# Patient Record
Sex: FEMALE | Race: WHITE | NOT HISPANIC OR LATINO | Employment: FULL TIME | URBAN - METROPOLITAN AREA
[De-identification: names, ages, dates, MRNs, and addresses within clinical notes are randomized per-mention and may not be internally consistent; named-entity substitution may affect disease eponyms.]

---

## 2017-09-20 ENCOUNTER — ALLSCRIPTS OFFICE VISIT (OUTPATIENT)
Dept: OTHER | Facility: OTHER | Age: 57
End: 2017-09-20

## 2017-09-20 DIAGNOSIS — Z12.31 ENCOUNTER FOR SCREENING MAMMOGRAM FOR MALIGNANT NEOPLASM OF BREAST: ICD-10-CM

## 2017-10-10 ENCOUNTER — HOSPITAL ENCOUNTER (OUTPATIENT)
Dept: RADIOLOGY | Age: 57
Discharge: HOME | End: 2017-10-10
Payer: COMMERCIAL

## 2017-10-10 DIAGNOSIS — Z12.31 ENCOUNTER FOR SCREENING MAMMOGRAM FOR MALIGNANT NEOPLASM OF BREAST: ICD-10-CM

## 2017-10-10 PROCEDURE — G0202 SCR MAMMO BI INCL CAD: HCPCS

## 2017-10-10 PROCEDURE — 77063 BREAST TOMOSYNTHESIS BI: CPT

## 2017-10-27 NOTE — PROGRESS NOTES
Assessment  1  Encounter for gynecological examination without abnormal finding (V72 31) (Z01 419)    Plan  Encounter for gynecological examination without abnormal finding    · Follow-up visit in 1 year Evaluation and Treatment  Follow-up  Status: Hold For -  Scheduling  Requested for: [id number]   Ordered; For: Encounter for gynecological examination without abnormal finding; Ordered By: Violetta Monson Performed:  Due: 01Rpv9448  Encounter for screening mammogram for malignant neoplasm of breast    · * MAMMO SCREENING BILATERAL W CAD; Status:Hold For - Scheduling; Requested  for:34Myt9919;    Perform:[facility]; Due:05Ztd9480; Ordered;For:Encounter for screening mammogram for malignant neoplasm of breast; Ordered By:Torsten Fox; Unlinked    · Omeprazole 20 MG Oral Capsule Delayed Release   Rx By: Radha Romero; Dispense: 30 Days ; #:30 00 CPDR; Refill: 0; SUSAN = Y; Sent To:    Discussion/Summary  healthy adult female the risks and benefits of cervical cancer screening were discussed cervical cancer screening is current Breast cancer screening: the risks and benefits of breast cancer screening were discussed, monthly self breast exam was advised and mammogram has been ordered  Colorectal cancer screening: the risks and benefits of colorectal cancer screening were discussed and colorectal cancer screening is current  Advice and education were given regarding weight bearing exercise, calcium supplements and vitamin D supplements  Chief Complaint  Pt is here for her yearly exam      History of Present Illness  HPI: 49year old white female here for yearly exam, no complaints  She is postmenopausal and has had no bleeding  She is sexually active without pain or bleeding  GYN HM, Adult Female [facility]: The patient is being seen for a health maintenance evaluation  The last health maintenance visit was 1 year(s) ago  General Health: The patient's health since the last visit is described as good  Lifestyle:  She consumes a diverse and healthy diet  -- She does not have any weight concerns  -- She does not exercise regularly  -- She does not use tobacco -- She consumes alcohol  She reports occasional alcohol use  -- She denies drug use  Reproductive health: the patient is postmenopausal--   she is sexually active  -- pregnancy history: G 3P 3,-- 3  Screening: Cervical cancer screening includes a pap smear performed 2014,neg  -- and-- human papilloma virus screening performed 2014,neg  Breast cancer screening includes a mammogram performed 2015, WNL  Colorectal cancer screening includes a colonoscopy performed within the past ten years  Metabolic screening includes no previous DEXA  Active Problems  1  Encounter for gynecological examination without abnormal finding (V72 31) (Z01 419)   2  Encounter for screening mammogram for malignant neoplasm of breast (V76 12)   (Z12 31)   3  Postmenopausal atrophic vaginitis (627 3) (N95 2)    Past Medical History   · History of depression (V11 8) (Z86 59)   · History of malignant melanoma (V10 82) (Z85 820)   · History of  (normal spontaneous vaginal delivery) (650) (O80)   · History of NVD (normal vaginal delivery) (650) (O80)    Surgical History   · History of  Section   · History of Complete Colonoscopy   · History of Knee Surgery Left   · History of Knee Surgery Right    Family History  Mother    · Family history of hypertension (V17 49) (Z82 49)   · Family history of osteoporosis (V17 81) (Z80 61)  Father    · Family history of cardiac disorder (V17 49) (Z82 49)   · Family history of colon cancer (V16 0) (Z80 0)   · Family history of diabetes mellitus (V18 0) (Z83 3)  Sister    · Family history of thyroid disease (V18 19) (Z83 49)    Social History   · Denied: History of Currently sexually active   · Denied: History of Exercise habits   ·    · Never a smoker   · No drug use   · Partner had vasectomy    Current Meds   1  Citalopram Hydrobromide 20 MG Oral Tablet; Therapy: 67CDZ4568 to (Last Rx:29Xes0134)  Requested for: 88Jzg8732 Ordered   2  Fluticasone Propionate 50 MCG/ACT Nasal Suspension; Therapy: 29Ssr5712 to (Last Rx:42Sxl8760)  Requested for: 20Wpa8082 Ordered   3  LamoTRIgine 25 MG Oral Tablet; Therapy: (Recorded:59Sfn9139) to Recorded   4  LORazepam 1 MG Oral Tablet; Therapy: (Recorded:28Ges8808) to Recorded   5  Omeprazole 20 MG Oral Capsule Delayed Release; Therapy: 74UQQ4012 to (Last Rx:95Igq9924)  Requested for: 95BWQ3391 Ordered    Allergies  1  No Known Drug Allergies    Vitals   Recorded: 09CPW1651 77:55HG   Systolic 460, LUE, Sitting   Diastolic 72, LUE, Sitting   Height 5 ft 1 5 in   Weight 172 lb    BMI Calculated 31 97   BSA Calculated 1 78   LMP      Physical Exam    Constitutional   General appearance: No acute distress, well appearing and well nourished  Neck   Neck: Normal, supple, trachea midline, no masses  Genitourinary   External genitalia: Normal and no lesions appreciated  Vagina: Normal, no lesions or dryness appreciated  Urethra: Normal     Urethral meatus: Normal     Bladder: Normal, soft, non-tender and no prolapse or masses appreciated  Cervix: Normal, no palpable masses  Uterus: Normal, non-tender, not enlarged, and no palpable masses  Adnexa/parametria: Normal, non-tender and no fullness or masses appreciated  Chest   Breasts: Normal and no dimpling or skin changes noted  Abdomen   Abdomen: Normal, non-tender, and no organomegaly noted         Signatures   Electronically signed by : Sparkle Toussaint [facility]; Sep 20 2017  2:09PM EST                       (Author)    Electronically signed by : JOE Collazo ; Sep 21 2017  1:19PM EST                       (Acknowledgement)

## 2017-12-05 ENCOUNTER — LAB REQUISITION (OUTPATIENT)
Dept: LAB | Facility: HOSPITAL | Age: 57
End: 2017-12-05
Payer: COMMERCIAL

## 2017-12-05 DIAGNOSIS — A41.01 SEPSIS DUE TO METHICILLIN SUSCEPTIBLE STAPHYLOCOCCUS AUREUS (HCC): ICD-10-CM

## 2017-12-05 PROCEDURE — 87205 SMEAR GRAM STAIN: CPT | Performed by: PHYSICIAN ASSISTANT

## 2017-12-05 PROCEDURE — 87070 CULTURE OTHR SPECIMN AEROBIC: CPT | Performed by: PHYSICIAN ASSISTANT

## 2017-12-07 LAB
BACTERIA WND AEROBE CULT: NORMAL
GRAM STN SPEC: NORMAL
GRAM STN SPEC: NORMAL

## 2018-01-14 VITALS
WEIGHT: 172 LBS | SYSTOLIC BLOOD PRESSURE: 120 MMHG | HEIGHT: 62 IN | BODY MASS INDEX: 31.65 KG/M2 | DIASTOLIC BLOOD PRESSURE: 72 MMHG

## 2018-04-28 ENCOUNTER — APPOINTMENT (OUTPATIENT)
Dept: LAB | Facility: CLINIC | Age: 58
End: 2018-04-28
Payer: COMMERCIAL

## 2018-04-28 ENCOUNTER — TRANSCRIBE ORDERS (OUTPATIENT)
Dept: LAB | Facility: CLINIC | Age: 58
End: 2018-04-28

## 2018-04-28 DIAGNOSIS — E66.9 OBESITY, UNSPECIFIED CLASSIFICATION, UNSPECIFIED OBESITY TYPE, UNSPECIFIED WHETHER SERIOUS COMORBIDITY PRESENT: ICD-10-CM

## 2018-04-28 DIAGNOSIS — E78.5 HYPERLIPIDEMIA, UNSPECIFIED HYPERLIPIDEMIA TYPE: Primary | ICD-10-CM

## 2018-04-28 DIAGNOSIS — E78.5 HYPERLIPIDEMIA, UNSPECIFIED HYPERLIPIDEMIA TYPE: ICD-10-CM

## 2018-04-28 LAB
ALBUMIN SERPL BCP-MCNC: 3.7 G/DL (ref 3.5–5)
ALP SERPL-CCNC: 116 U/L (ref 46–116)
ALT SERPL W P-5'-P-CCNC: 35 U/L (ref 12–78)
ANION GAP SERPL CALCULATED.3IONS-SCNC: 6 MMOL/L (ref 4–13)
AST SERPL W P-5'-P-CCNC: 19 U/L (ref 5–45)
BASOPHILS # BLD AUTO: 0.01 THOUSANDS/ΜL (ref 0–0.1)
BASOPHILS NFR BLD AUTO: 0 % (ref 0–1)
BILIRUB SERPL-MCNC: 0.4 MG/DL (ref 0.2–1)
BUN SERPL-MCNC: 7 MG/DL (ref 5–25)
CALCIUM SERPL-MCNC: 9.4 MG/DL (ref 8.3–10.1)
CHLORIDE SERPL-SCNC: 104 MMOL/L (ref 100–108)
CHOLEST SERPL-MCNC: 212 MG/DL (ref 50–200)
CO2 SERPL-SCNC: 29 MMOL/L (ref 21–32)
CREAT SERPL-MCNC: 0.84 MG/DL (ref 0.6–1.3)
EOSINOPHIL # BLD AUTO: 0.12 THOUSAND/ΜL (ref 0–0.61)
EOSINOPHIL NFR BLD AUTO: 2 % (ref 0–6)
ERYTHROCYTE [DISTWIDTH] IN BLOOD BY AUTOMATED COUNT: 12.2 % (ref 11.6–15.1)
GFR SERPL CREATININE-BSD FRML MDRD: 78 ML/MIN/1.73SQ M
GLUCOSE P FAST SERPL-MCNC: 104 MG/DL (ref 65–99)
HCT VFR BLD AUTO: 43.4 % (ref 34.8–46.1)
HDLC SERPL-MCNC: 53 MG/DL (ref 40–60)
HGB BLD-MCNC: 14.3 G/DL (ref 11.5–15.4)
LDLC SERPL CALC-MCNC: 144 MG/DL (ref 0–100)
LYMPHOCYTES # BLD AUTO: 1.65 THOUSANDS/ΜL (ref 0.6–4.47)
LYMPHOCYTES NFR BLD AUTO: 26 % (ref 14–44)
MCH RBC QN AUTO: 30.6 PG (ref 26.8–34.3)
MCHC RBC AUTO-ENTMCNC: 32.9 G/DL (ref 31.4–37.4)
MCV RBC AUTO: 93 FL (ref 82–98)
MONOCYTES # BLD AUTO: 0.34 THOUSAND/ΜL (ref 0.17–1.22)
MONOCYTES NFR BLD AUTO: 5 % (ref 4–12)
NEUTROPHILS # BLD AUTO: 4.22 THOUSANDS/ΜL (ref 1.85–7.62)
NEUTS SEG NFR BLD AUTO: 67 % (ref 43–75)
NONHDLC SERPL-MCNC: 159 MG/DL
PLATELET # BLD AUTO: 329 THOUSANDS/UL (ref 149–390)
PMV BLD AUTO: 9.2 FL (ref 8.9–12.7)
POTASSIUM SERPL-SCNC: 4.3 MMOL/L (ref 3.5–5.3)
PROT SERPL-MCNC: 7.2 G/DL (ref 6.4–8.2)
RBC # BLD AUTO: 4.68 MILLION/UL (ref 3.81–5.12)
SODIUM SERPL-SCNC: 139 MMOL/L (ref 136–145)
TRIGL SERPL-MCNC: 73 MG/DL
TSH SERPL DL<=0.05 MIU/L-ACNC: 2.41 UIU/ML (ref 0.36–3.74)
WBC # BLD AUTO: 6.34 THOUSAND/UL (ref 4.31–10.16)

## 2018-04-28 PROCEDURE — 85025 COMPLETE CBC W/AUTO DIFF WBC: CPT

## 2018-04-28 PROCEDURE — 80061 LIPID PANEL: CPT

## 2018-04-28 PROCEDURE — 84443 ASSAY THYROID STIM HORMONE: CPT

## 2018-04-28 PROCEDURE — 80053 COMPREHEN METABOLIC PANEL: CPT

## 2018-04-28 PROCEDURE — 36415 COLL VENOUS BLD VENIPUNCTURE: CPT

## 2018-05-25 ENCOUNTER — OFFICE VISIT (OUTPATIENT)
Dept: OBGYN CLINIC | Facility: CLINIC | Age: 58
End: 2018-05-25
Payer: COMMERCIAL

## 2018-05-25 VITALS — DIASTOLIC BLOOD PRESSURE: 74 MMHG | BODY MASS INDEX: 34.02 KG/M2 | SYSTOLIC BLOOD PRESSURE: 120 MMHG | WEIGHT: 183 LBS

## 2018-05-25 DIAGNOSIS — B36.9 FUNGAL DERMATITIS: Primary | ICD-10-CM

## 2018-05-25 PROCEDURE — 99214 OFFICE O/P EST MOD 30 MIN: CPT | Performed by: PHYSICIAN ASSISTANT

## 2018-05-25 RX ORDER — RABEPRAZOLE SODIUM 20 MG/1
TABLET, DELAYED RELEASE ORAL
Refills: 0 | COMMUNITY
Start: 2018-05-23 | End: 2018-09-21 | Stop reason: ALTCHOICE

## 2018-05-25 RX ORDER — NYSTATIN 100000 [USP'U]/G
POWDER TOPICAL 2 TIMES DAILY
Qty: 60 G | Refills: 3 | Status: SHIPPED | OUTPATIENT
Start: 2018-05-25 | End: 2018-12-21

## 2018-05-25 RX ORDER — LORAZEPAM 1 MG/1
TABLET ORAL
Refills: 0 | COMMUNITY
Start: 2018-05-21 | End: 2019-10-01 | Stop reason: ALTCHOICE

## 2018-05-25 RX ORDER — ESCITALOPRAM OXALATE 10 MG/1
10 TABLET ORAL EVERY MORNING
Refills: 0 | COMMUNITY
Start: 2018-04-27

## 2018-05-25 RX ORDER — LAMOTRIGINE 25 MG/1
TABLET ORAL
Refills: 0 | COMMUNITY
Start: 2018-05-22 | End: 2019-10-01 | Stop reason: ALTCHOICE

## 2018-09-21 ENCOUNTER — ANNUAL EXAM (OUTPATIENT)
Dept: OBGYN CLINIC | Facility: CLINIC | Age: 58
End: 2018-09-21
Payer: COMMERCIAL

## 2018-09-21 VITALS
DIASTOLIC BLOOD PRESSURE: 74 MMHG | SYSTOLIC BLOOD PRESSURE: 120 MMHG | BODY MASS INDEX: 34.36 KG/M2 | HEIGHT: 61 IN | WEIGHT: 182 LBS

## 2018-09-21 DIAGNOSIS — R53.82 CHRONIC FATIGUE: ICD-10-CM

## 2018-09-21 DIAGNOSIS — Z12.31 ENCOUNTER FOR SCREENING MAMMOGRAM FOR MALIGNANT NEOPLASM OF BREAST: ICD-10-CM

## 2018-09-21 DIAGNOSIS — N95.2 ATROPHIC VAGINITIS: ICD-10-CM

## 2018-09-21 DIAGNOSIS — Z01.419 ENCNTR FOR GYN EXAM (GENERAL) (ROUTINE) W/O ABN FINDINGS: Primary | ICD-10-CM

## 2018-09-21 PROCEDURE — S0612 ANNUAL GYNECOLOGICAL EXAMINA: HCPCS | Performed by: PHYSICIAN ASSISTANT

## 2018-09-21 RX ORDER — CETIRIZINE HYDROCHLORIDE 10 MG/1
10 TABLET ORAL DAILY
COMMUNITY
End: 2019-10-01 | Stop reason: ALTCHOICE

## 2018-09-21 RX ORDER — OMEPRAZOLE 20 MG/1
20 CAPSULE, DELAYED RELEASE ORAL DAILY
COMMUNITY
End: 2021-10-22 | Stop reason: SDUPTHER

## 2018-09-21 RX ORDER — ESTRADIOL 0.1 MG/G
1 CREAM VAGINAL 2 TIMES WEEKLY
Qty: 42.5 G | Refills: 3 | Status: SHIPPED | OUTPATIENT
Start: 2018-09-24 | End: 2019-10-01 | Stop reason: ALTCHOICE

## 2018-09-21 NOTE — PROGRESS NOTES
Vinnie Duque   1960    CC:  Yearly exam    S:  56 y o  female here for yearly exam  She is postmenopausal and has had no vaginal bleeding  She denies vaginal discharge, itching, odor or dryness  She is sexually active with discomfort despite silicone lubricants  We discussed a trial of Estrace cream and she would like to try this  We also discussed Shirley Nicks but mentioned that it may worsen her hot flashes  Last Pap 7/10/14 neg/neg  Last Mammo 10/10/17 neg  Last Colonoscopy 3 years ago     Current Outpatient Prescriptions:     cetirizine (ZyrTEC) 10 mg tablet, Take 10 mg by mouth daily, Disp: , Rfl:     escitalopram (LEXAPRO) 10 mg tablet, Take 10 mg by mouth every morning, Disp: , Rfl: 0    lamoTRIgine (LaMICtal) 25 mg tablet, , Disp: , Rfl: 0    LORazepam (ATIVAN) 1 mg tablet, , Disp: , Rfl: 0    nystatin (MYCOSTATIN) powder, Apply topically 2 (two) times a day, Disp: 60 g, Rfl: 3    omeprazole (PriLOSEC) 20 mg delayed release capsule, Take 20 mg by mouth daily, Disp: , Rfl:     [START ON 9/24/2018] estradiol (ESTRACE) 0 1 mg/g vaginal cream, Insert 1 g into the vagina 2 (two) times a week, Disp: 42 5 g, Rfl: 3  Social History     Social History    Marital status: /Civil Union     Spouse name: N/A    Number of children: N/A    Years of education: N/A     Occupational History    Not on file       Social History Main Topics    Smoking status: Never Smoker    Smokeless tobacco: Never Used    Alcohol use Yes      Comment: occ    Drug use: No    Sexual activity: Yes     Partners: Male     Other Topics Concern    Not on file     Social History Narrative    Partner had vasectomy         Family History   Problem Relation Age of Onset    Hypertension Mother     Osteoporosis Mother     Dementia Mother     Heart disease Father         cardiac disorder , colon cancer, diabetes    Colon cancer Father     Thyroid disease Sister     Cancer Brother         bladder cancer     Past Medical History:   Diagnosis Date    Basal cell adenocarcinoma     Depression     last assessed 06/10/2014        O:  Blood pressure 120/74, height 5' 1 02" (1 55 m), weight 82 6 kg (182 lb)  Patient appears well and is not in distress  Neck is supple without masses  Breasts are symmetrical without mass, tenderness, nipple discharge, skin changes or adenopathy  Abdomen is soft and nontender without masses  External genitals are normal without lesions or rashes  Vagina is normal without discharge or bleeding  Cervix is normal without discharge or lesion  Uterus is normal, mobile, nontender without palpable mass  Adnexa are normal, nontender, without palpable mass  A:  Yearly exam      P:   Pap 2019   Mammo slip given   Check CMP, TSH, and vitamin D for fatigue and hx    elevated glucose   RTO one year for yearly exam or sooner as needed

## 2018-09-26 DIAGNOSIS — Z78.0 MENOPAUSE: Primary | ICD-10-CM

## 2018-09-26 NOTE — TELEPHONE ENCOUNTER
Patient called and left voicemail - stated the hormone cream is not working and would like to go on a hormone pill

## 2018-09-28 NOTE — TELEPHONE ENCOUNTER
LMOM for pt to cb  Need to know is pt is ok with paying the $80 00  Migdalia Hamilton     Ext: [phone]

## 2018-09-28 NOTE — TELEPHONE ENCOUNTER
Patient had returned call - she stated she would like to start taking Dory Ball - that was mentioned at her last appointment  Please advise  Thanks!

## 2018-09-28 NOTE — TELEPHONE ENCOUNTER
Ok to start Osphena one po daily  Please remind her that the Estrace cream would take 3 months to work, so she hasn't been using it regularly long enough for it to have worked yet  But I have no problem trying her on [place]  Please send it to [facility] order - let her know they will call her - they will run thru her insurance as well as their program to see which is less expensive   Shouldn't be more than $80 for 3 months

## 2018-10-08 ENCOUNTER — TELEPHONE (OUTPATIENT)
Dept: OBGYN CLINIC | Facility: CLINIC | Age: 58
End: 2018-10-08

## 2018-10-08 ENCOUNTER — APPOINTMENT (OUTPATIENT)
Dept: LAB | Facility: CLINIC | Age: 58
End: 2018-10-08
Payer: COMMERCIAL

## 2018-10-08 DIAGNOSIS — R53.82 CHRONIC FATIGUE: ICD-10-CM

## 2018-10-08 LAB
25(OH)D3 SERPL-MCNC: 23.5 NG/ML (ref 30–100)
ALBUMIN SERPL BCP-MCNC: 3.9 G/DL (ref 3.5–5)
ALP SERPL-CCNC: 118 U/L (ref 46–116)
ALT SERPL W P-5'-P-CCNC: 40 U/L (ref 12–78)
ANION GAP SERPL CALCULATED.3IONS-SCNC: 10 MMOL/L (ref 4–13)
AST SERPL W P-5'-P-CCNC: 23 U/L (ref 5–45)
BILIRUB SERPL-MCNC: 0.3 MG/DL (ref 0.2–1)
BUN SERPL-MCNC: 8 MG/DL (ref 5–25)
CALCIUM SERPL-MCNC: 9.2 MG/DL (ref 8.3–10.1)
CHLORIDE SERPL-SCNC: 103 MMOL/L (ref 100–108)
CO2 SERPL-SCNC: 28 MMOL/L (ref 21–32)
CREAT SERPL-MCNC: 0.83 MG/DL (ref 0.6–1.3)
GFR SERPL CREATININE-BSD FRML MDRD: 80 ML/MIN/1.73SQ M
GLUCOSE P FAST SERPL-MCNC: 110 MG/DL (ref 65–99)
POTASSIUM SERPL-SCNC: 3.8 MMOL/L (ref 3.5–5.3)
PROT SERPL-MCNC: 7.4 G/DL (ref 6.4–8.2)
SODIUM SERPL-SCNC: 141 MMOL/L (ref 136–145)
TSH SERPL DL<=0.05 MIU/L-ACNC: 2.97 UIU/ML (ref 0.36–3.74)

## 2018-10-08 PROCEDURE — 36415 COLL VENOUS BLD VENIPUNCTURE: CPT

## 2018-10-08 PROCEDURE — 82306 VITAMIN D 25 HYDROXY: CPT

## 2018-10-08 PROCEDURE — 84443 ASSAY THYROID STIM HORMONE: CPT

## 2018-10-08 PROCEDURE — 80053 COMPREHEN METABOLIC PANEL: CPT

## 2018-10-08 NOTE — TELEPHONE ENCOUNTER
Patient is aware of b/w results  She will start taking Vit D supplements and will make an appointment with her PCP for more work up due to her glucose results

## 2018-10-08 NOTE — TELEPHONE ENCOUNTER
----- Message from Paola Becerra PA-C sent at 10/8/2018 11:43 AM EDT -----  Please have daniel see her PCP regarding her glucose  She should add vitamin D 2000 IU daily

## 2018-11-03 ENCOUNTER — TRANSCRIBE ORDERS (OUTPATIENT)
Dept: LAB | Facility: CLINIC | Age: 58
End: 2018-11-03

## 2018-11-03 ENCOUNTER — APPOINTMENT (OUTPATIENT)
Dept: LAB | Facility: CLINIC | Age: 58
End: 2018-11-03
Payer: COMMERCIAL

## 2018-11-03 DIAGNOSIS — E53.9 VITAMIN B-COMPLEX DEFICIENCY: ICD-10-CM

## 2018-11-03 DIAGNOSIS — R73.09 OTHER ABNORMAL GLUCOSE: Primary | ICD-10-CM

## 2018-11-03 DIAGNOSIS — R73.09 OTHER ABNORMAL GLUCOSE: ICD-10-CM

## 2018-11-03 LAB
EST. AVERAGE GLUCOSE BLD GHB EST-MCNC: 140 MG/DL
GLUCOSE P FAST SERPL-MCNC: 111 MG/DL (ref 65–99)
HBA1C MFR BLD: 6.5 % (ref 4.2–6.3)
VIT B12 SERPL-MCNC: 457 PG/ML (ref 100–900)

## 2018-11-03 PROCEDURE — 36415 COLL VENOUS BLD VENIPUNCTURE: CPT

## 2018-11-03 PROCEDURE — 83036 HEMOGLOBIN GLYCOSYLATED A1C: CPT

## 2018-11-03 PROCEDURE — 82947 ASSAY GLUCOSE BLOOD QUANT: CPT

## 2018-11-03 PROCEDURE — 82607 VITAMIN B-12: CPT

## 2018-12-21 ENCOUNTER — HOSPITAL ENCOUNTER (EMERGENCY)
Facility: HOSPITAL | Age: 58
Discharge: HOME | End: 2018-12-21
Attending: EMERGENCY MEDICINE | Admitting: EMERGENCY MEDICINE
Payer: COMMERCIAL

## 2018-12-21 VITALS
SYSTOLIC BLOOD PRESSURE: 140 MMHG | DIASTOLIC BLOOD PRESSURE: 82 MMHG | OXYGEN SATURATION: 95 % | BODY MASS INDEX: 33.98 KG/M2 | HEART RATE: 88 BPM | RESPIRATION RATE: 20 BRPM | TEMPERATURE: 98 F | WEIGHT: 180 LBS

## 2018-12-21 DIAGNOSIS — F41.0 PANIC DISORDER: Primary | ICD-10-CM

## 2018-12-21 DIAGNOSIS — F41.9 ANXIETY AND DEPRESSION: ICD-10-CM

## 2018-12-21 DIAGNOSIS — F32.A ANXIETY AND DEPRESSION: ICD-10-CM

## 2018-12-21 LAB
ALBUMIN SERPL BCP-MCNC: 3.8 G/DL (ref 3.5–5)
ALP SERPL-CCNC: 120 U/L (ref 46–116)
ALT SERPL W P-5'-P-CCNC: 29 U/L (ref 12–78)
AMPHETAMINES SERPL QL SCN: NEGATIVE
ANION GAP SERPL CALCULATED.3IONS-SCNC: 9 MMOL/L (ref 4–13)
AST SERPL W P-5'-P-CCNC: 20 U/L (ref 5–45)
BARBITURATES UR QL: NEGATIVE
BASOPHILS # BLD AUTO: 0.04 THOUSANDS/ΜL (ref 0–0.1)
BASOPHILS NFR BLD AUTO: 0 % (ref 0–1)
BENZODIAZ UR QL: NEGATIVE
BILIRUB SERPL-MCNC: 0.2 MG/DL (ref 0.2–1)
BUN SERPL-MCNC: 11 MG/DL (ref 5–25)
CALCIUM SERPL-MCNC: 8.8 MG/DL (ref 8.3–10.1)
CHLORIDE SERPL-SCNC: 106 MMOL/L (ref 100–108)
CO2 SERPL-SCNC: 28 MMOL/L (ref 21–32)
COCAINE UR QL: NEGATIVE
CREAT SERPL-MCNC: 0.92 MG/DL (ref 0.6–1.3)
EOSINOPHIL # BLD AUTO: 0.08 THOUSAND/ΜL (ref 0–0.61)
EOSINOPHIL NFR BLD AUTO: 1 % (ref 0–6)
ERYTHROCYTE [DISTWIDTH] IN BLOOD BY AUTOMATED COUNT: 12.1 % (ref 11.6–15.1)
ETHANOL EXG-MCNC: 0 MG/DL
GFR SERPL CREATININE-BSD FRML MDRD: 70 ML/MIN/1.73SQ M
GLUCOSE SERPL-MCNC: 117 MG/DL (ref 65–140)
HCT VFR BLD AUTO: 41.6 % (ref 34.8–46.1)
HGB BLD-MCNC: 13.8 G/DL (ref 11.5–15.4)
IMM GRANULOCYTES # BLD AUTO: 0.03 THOUSAND/UL (ref 0–0.2)
IMM GRANULOCYTES NFR BLD AUTO: 0 % (ref 0–2)
LYMPHOCYTES # BLD AUTO: 1.6 THOUSANDS/ΜL (ref 0.6–4.47)
LYMPHOCYTES NFR BLD AUTO: 18 % (ref 14–44)
MCH RBC QN AUTO: 30.5 PG (ref 26.8–34.3)
MCHC RBC AUTO-ENTMCNC: 33.2 G/DL (ref 31.4–37.4)
MCV RBC AUTO: 92 FL (ref 82–98)
METHADONE UR QL: NEGATIVE
MONOCYTES # BLD AUTO: 0.49 THOUSAND/ΜL (ref 0.17–1.22)
MONOCYTES NFR BLD AUTO: 5 % (ref 4–12)
NEUTROPHILS # BLD AUTO: 6.88 THOUSANDS/ΜL (ref 1.85–7.62)
NEUTS SEG NFR BLD AUTO: 76 % (ref 43–75)
NRBC BLD AUTO-RTO: 0 /100 WBCS
OPIATES UR QL SCN: NEGATIVE
PCP UR QL: NEGATIVE
PLATELET # BLD AUTO: 305 THOUSANDS/UL (ref 149–390)
PMV BLD AUTO: 9.1 FL (ref 8.9–12.7)
POTASSIUM SERPL-SCNC: 3.7 MMOL/L (ref 3.5–5.3)
PROT SERPL-MCNC: 7.1 G/DL (ref 6.4–8.2)
RBC # BLD AUTO: 4.53 MILLION/UL (ref 3.81–5.12)
SODIUM SERPL-SCNC: 143 MMOL/L (ref 136–145)
THC UR QL: NEGATIVE
TSH SERPL DL<=0.05 MIU/L-ACNC: 2.53 UIU/ML (ref 0.36–3.74)
WBC # BLD AUTO: 9.12 THOUSAND/UL (ref 4.31–10.16)

## 2018-12-21 PROCEDURE — 36415 COLL VENOUS BLD VENIPUNCTURE: CPT | Performed by: EMERGENCY MEDICINE

## 2018-12-21 PROCEDURE — 80307 DRUG TEST PRSMV CHEM ANLYZR: CPT | Performed by: EMERGENCY MEDICINE

## 2018-12-21 PROCEDURE — 84443 ASSAY THYROID STIM HORMONE: CPT | Performed by: EMERGENCY MEDICINE

## 2018-12-21 PROCEDURE — 99284 EMERGENCY DEPT VISIT MOD MDM: CPT

## 2018-12-21 PROCEDURE — 80053 COMPREHEN METABOLIC PANEL: CPT | Performed by: EMERGENCY MEDICINE

## 2018-12-21 PROCEDURE — 82075 ASSAY OF BREATH ETHANOL: CPT | Performed by: EMERGENCY MEDICINE

## 2018-12-21 PROCEDURE — 93005 ELECTROCARDIOGRAM TRACING: CPT

## 2018-12-21 PROCEDURE — 85025 COMPLETE CBC W/AUTO DIFF WBC: CPT | Performed by: EMERGENCY MEDICINE

## 2018-12-21 PROCEDURE — 96372 THER/PROPH/DIAG INJ SC/IM: CPT

## 2018-12-21 RX ORDER — LORAZEPAM 2 MG/ML
2 INJECTION INTRAMUSCULAR ONCE
Status: COMPLETED | OUTPATIENT
Start: 2018-12-21 | End: 2018-12-21

## 2018-12-21 RX ADMIN — LORAZEPAM 2 MG: 2 INJECTION INTRAMUSCULAR; INTRAVENOUS at 17:47

## 2018-12-21 NOTE — ED NOTES
Patient presents to the Emergency Room with her  due to panic attack  Patient reports she had a similar situation 5 years ago  Patient states she had a psychiatrist who had retired but she was referred to continue her medications through the family care doctor which she has  Patient states she recently started taking a hormone prescribed through her OBGYN and since then she has felt more anxious  Drewt also states there are several changes happening in her home including the Special needs woman who has been living with them is started to regress and they are worried she will have to move out soon, her daughter is planning to move across the state in may, and the holidays are always stressful  Patient also states she recently has been struggling with her bulimia due to a nutrionist telling her that he blood sugar is high  Patient also reports her daughter in law recently had a miscarriage  Patient believes she needs a psychiatrist to treat her  Patient will be discharged with out patient resources

## 2018-12-21 NOTE — ED NOTES
[phone] Archana Lemus (call him if anything happens he has to step out for a moment)        Regan Arciniega  12/21/18 [id number]

## 2018-12-21 NOTE — ED PROVIDER NOTES
History  Chief Complaint   Patient presents with    Panic Attack     Pt reports not being able to sleep for the past 3 days  Pt reports feeling anxious  Pt presents crying and overwhelmed  Pt took 1mg of Lorazepam and benadyrl  Pt denies SI/HI  Pt had a similar experience about 5 years ago when her pysch meds were changed  80-year-old female presents to the emergency department for psychiatric evaluation  Patient states that she has a longstanding history of anxiety and depression  For the past 3 days she has been having nightmares and difficulty sleeping  Patient is extremely anxious and tearful  Her  is at the bedside and states that she has been taking lorazepam and Benadryl without relief  Patient does recall having similar breakdown a few years ago  Patient states at times she does heat herself but is not suicidal and is not homicidal   She has not attempted to hurt herself or overdose  Patient's  is very supportive  Patient does feel that she is stressed because of the upcoming holiday  She denies drug and alcohol use  Upon my initial assessment the patient is agitated  She is crying and pacing around the room  History provided by:  Patient and medical records   used: No    Psychiatric Evaluation   Presenting symptoms: agitation    Presenting symptoms: no homicidal ideas, no suicidal thoughts, no suicidal threats and no suicide attempt    Patient accompanied by:  Family member  Degree of incapacity (severity):  Severe  Onset quality:  Gradual  Duration:  3 days  Timing:  Constant  Progression:  Worsening  Chronicity:  Recurrent  Context: stressful life event    Context: not alcohol use and not drug abuse    Treatment compliance:   All of the time  Relieved by:  Nothing  Worsened by:  Lack of sleep  Ineffective treatments:  Benzodiazepines and anti-anxiety medications  Associated symptoms: anxiety, appetite change, feelings of worthlessness, hyperventilating, insomnia and irritability    Associated symptoms: no abdominal pain    Risk factors: hx of mental illness    Risk factors: no hx of suicide attempts and no recent psychiatric admission        Prior to Admission Medications   Prescriptions Last Dose Informant Patient Reported? Taking? LORazepam (ATIVAN) 1 mg tablet  Self Yes Yes   Ospemifene 60 MG TABS   No Yes   Sig: Take 1 tablet (60 mg total) by mouth daily   cetirizine (ZyrTEC) 10 mg tablet  Self Yes No   Sig: Take 10 mg by mouth daily   escitalopram (LEXAPRO) 10 mg tablet  Self Yes Yes   Sig: Take 10 mg by mouth every morning   estradiol (ESTRACE) 0 1 mg/g vaginal cream   No No   Sig: Insert 1 g into the vagina 2 (two) times a week   lamoTRIgine (LaMICtal) 25 mg tablet  Self Yes Yes   omeprazole (PriLOSEC) 20 mg delayed release capsule  Self Yes Yes   Sig: Take 20 mg by mouth daily      Facility-Administered Medications: None       Past Medical History:   Diagnosis Date    Basal cell adenocarcinoma     Depression     last assessed 06/10/2014       Past Surgical History:   Procedure Laterality Date     SECTION      COLONOSCOPY      HIATAL HERNIA REPAIR  2017    KNEE SURGERY      left , right    MOHS SURGERY         Family History   Problem Relation Age of Onset    Hypertension Mother     Osteoporosis Mother     Dementia Mother     Heart disease Father         cardiac disorder , colon cancer, diabetes    Colon cancer Father     Thyroid disease Sister     Cancer Brother         bladder cancer     I have reviewed and agree with the history as documented  Social History   Substance Use Topics    Smoking status: Never Smoker    Smokeless tobacco: Never Used    Alcohol use Yes      Comment: occ        Review of Systems   Constitutional: Positive for appetite change and irritability  Gastrointestinal: Negative for abdominal pain  Psychiatric/Behavioral: Positive for agitation, dysphoric mood and sleep disturbance  Negative for homicidal ideas and suicidal ideas  The patient is nervous/anxious and has insomnia  All other systems reviewed and are negative  Physical Exam  Physical Exam   Constitutional: She is oriented to person, place, and time  She appears well-developed and well-nourished  She appears distressed  HENT:   Head: Normocephalic  Nose: Nose normal    Mouth/Throat: Oropharynx is clear and moist  No oropharyngeal exudate  Eyes: Pupils are equal, round, and reactive to light  Conjunctivae and EOM are normal    Neck: Normal range of motion  Neck supple  Cardiovascular: Normal rate, regular rhythm, normal heart sounds and intact distal pulses  Pulmonary/Chest: Effort normal and breath sounds normal    Abdominal: Soft  Bowel sounds are normal  She exhibits no distension  There is no tenderness  There is no rebound and no guarding  Musculoskeletal: Normal range of motion  She exhibits no edema, tenderness or deformity  Lymphadenopathy:     She has no cervical adenopathy  Neurological: She is alert and oriented to person, place, and time  She has normal strength and normal reflexes  No cranial nerve deficit or sensory deficit  She exhibits normal muscle tone  Coordination and gait normal    Skin: Skin is warm, dry and intact  No rash noted  Psychiatric: Her behavior is normal  Judgment and thought content normal  Her mood appears anxious  Cognition and memory are normal  She expresses no homicidal and no suicidal ideation  She expresses no suicidal plans and no homicidal plans  Nursing note and vitals reviewed        Vital Signs  ED Triage Vitals   Temperature Pulse Respirations Blood Pressure SpO2   12/21/18 1608 12/21/18 1615 12/21/18 1615 12/21/18 1608 12/21/18 1615   98 °F (36 7 °C) (!) 112 22 165/92 98 %      Temp Source Heart Rate Source Patient Position - Orthostatic VS BP Location FiO2 (%)   12/21/18 1608 12/21/18 1750 12/21/18 1608 12/21/18 1608 --   Oral Monitor Lying Left arm Pain Score       --                  Vitals:    12/21/18 1608 12/21/18 1615 12/21/18 1750   BP: 165/92  140/82   Pulse:  (!) 112 88   Patient Position - Orthostatic VS: Lying  Lying       Visual Acuity      ED Medications  Medications   LORazepam (ATIVAN) 2 mg/mL injection 2 mg (2 mg Intramuscular Given 12/21/18 1747)       Diagnostic Studies  Results Reviewed     Procedure Component Value Units Date/Time    TSH [[id number]]  (Normal) Collected:  12/21/18 1738    Lab Status:  Final result Specimen:  Blood from Arm, Right Updated:  12/21/18 1810     TSH 3RD GENERATON 2 531 uIU/mL     Narrative:         Patients undergoing fluorescein dye angiography may retain small amounts of fluorescein in the body for 48-72 hours post procedure  Samples containing fluorescein can produce falsely depressed TSH values  If the patient had this procedure,a specimen should be resubmitted post fluorescein clearance  The recommended reference ranges for TSH during pregnancy are as follows:  First trimester 0 1 to 2 5 uIU/mL  Second trimester  0 2 to 3 0 uIU/mL  Third trimester 0 3 to 3 0 uIU/m      Comprehensive metabolic panel [[id number]]  (Abnormal) Collected:  12/21/18 1738    Lab Status:  Final result Specimen:  Blood from Arm, Right Updated:  12/21/18 1801     Sodium 143 mmol/L      Potassium 3 7 mmol/L      Chloride 106 mmol/L      CO2 28 mmol/L      ANION GAP 9 mmol/L      BUN 11 mg/dL      Creatinine 0 92 mg/dL      Glucose 117 mg/dL      Calcium 8 8 mg/dL      AST 20 U/L      ALT 29 U/L      Alkaline Phosphatase 120 (H) U/L      Total Protein 7 1 g/dL      Albumin 3 8 g/dL      Total Bilirubin 0 20 mg/dL      eGFR 70 ml/min/1 73sq m     Narrative:         National Kid[employer] Disease Education Program recommendations are as follows:  GFR calculation is accurate only with a steady state creatinine  Chronic Kidney disease less than 60 ml/min/1 73 sq  meters  Kidney failure less than 15 ml/min/1 73 sq  meters      POCT alcohol breath test [[id number]]  (Normal) Resulted:  12/21/18 1751    Lab Status:  Final result Updated:  12/21/18 1751     EXTBreath Alcohol 0 00    CBC and differential [[id number]]  (Abnormal) Collected:  12/21/18 1738    Lab Status:  Final result Specimen:  Blood from Arm, Right Updated:  12/21/18 1745     WBC 9 12 Thousand/uL      RBC 4 53 Million/uL      Hemoglobin 13 8 g/dL      Hematocrit 41 6 %      MCV 92 fL      MCH 30 5 pg      MCHC 33 2 g/dL      RDW 12 1 %      MPV 9 1 fL      Platelets 602 Thousands/uL      nRBC 0 /100 WBCs      Neutrophils Relative 76 (H) %      Immat GRANS % 0 %      Lymphocytes Relative 18 %      Monocytes Relative 5 %      Eosinophils Relative 1 %      Basophils Relative 0 %      Neutrophils Absolute 6 88 Thousands/µL      Immature Grans Absolute 0 03 Thousand/uL      Lymphocytes Absolute 1 60 Thousands/µL      Monocytes Absolute 0 49 Thousand/µL      Eosinophils Absolute 0 08 Thousand/µL      Basophils Absolute 0 04 Thousands/µL     Rapid drug screen, urine [[id number]]  (Normal) Collected:  12/21/18 1716    Lab Status:  Final result Specimen:  Urine from Urine, Clean Catch Updated:  12/21/18 1738     Amph/Meth UR Negative     Barbiturate Ur Negative     Benzodiazepine Urine Negative     Cocaine Urine Negative     Methadone Urine Negative     Opiate Urine Negative     PCP Ur Negative     THC Urine Negative    Narrative:         FOR MEDICAL PURPOSES ONLY  IF CONFIRMATION NEEDED PLEASE CONTACT THE LAB WITHIN 5 DAYS      Drug Screen Cutoff Levels:  AMPHETAMINE/METHAMPHETAMINES  1000 ng/mL  BARBITURATES     200 ng/mL  BENZODIAZEPINES     200 ng/mL  COCAINE      300 ng/mL  METHADONE      300 ng/mL  OPIATES      300 ng/mL  PHENCYCLIDINE     25 ng/mL  THC       50 ng/mL                 No orders to display              Procedures  ECG 12 Lead Documentation  Date/Time: 12/21/2018 6:57 PM  Performed by: KALE MCCRAY  Authorized by: KALE MCCRAY     Indications / Diagnosis:  Medical clearance for psychiatric evaluation  ECG reviewed by me, the ED Provider: yes    Patient location:  ED  Previous ECG:     Previous ECG:  Unavailable  Interpretation:     Interpretation: normal    Rate:     ECG rate:  97    ECG rate assessment: normal    Rhythm:     Rhythm: sinus rhythm    Ectopy:     Ectopy: none    QRS:     QRS axis:  Normal  Conduction:     Conduction: normal    ST segments:     ST segments:  Normal  T waves:     T waves: normal             Phone Contacts  ED Phone Contact    ED Course                               MDM  Number of Diagnoses or Management Options  Anxiety and depression: new and requires workup  Panic disorder: new and requires workup     Amount and/or Complexity of Data Reviewed  Clinical lab tests: ordered and reviewed  Tests in the medicine section of CPT®: ordered and reviewed  Decide to obtain previous medical records or to obtain history from someone other than the patient: yes  Obtain history from someone other than the patient: yes  Discuss the patient with other providers: yes  Independent visualization of images, tracings, or specimens: yes    Risk of Complications, Morbidity, and/or Mortality  General comments: 61-year-old female presents with insomnia, anxiety and panic attacks  Patient feels significantly improved after talking with crisis and receiving lorazepam   She feels comfortable for discharge plan to follow up as an outpatient  She does not feel that she would inpatient treatment at time  Patient's  is at the bedside and agrees with discharge plan  Discussed signs symptoms to return to the emergency department      Patient Progress  Patient progress: improved    CritCare Time    Disposition  Final diagnoses:   Panic disorder   Anxiety and depression     Time reflects when diagnosis was documented in both MDM as applicable and the Disposition within this note     Time User Action Codes Description Comment    12/21/2018  6:38 PM Shazia Rivera Add [F41 0] Panic disorder 12/21/2018  6:40 PM Shazia Rivera Add [F41 9,  F32 9] Anxiety and depression       ED Disposition     ED Disposition Condition Comment    Discharge  [facility] discharge to home/self care  Condition at discharge: Stable        MD Documentation      Most Recent Value   Sending MD Dr Delayne Leyden up With Specialties Details Why Catrina Catalan MD Internal Medicine Schedule an appointment as soon as possible for a visit in 2 days For recheck of current symptoms [address] [place] [address] [address]  [phone]            Discharge Medication List as of 12/21/2018  6:45 PM      CONTINUE these medications which have NOT CHANGED    Details   escitalopram (LEXAPRO) 10 mg tablet Take 10 mg by mouth every morning, Starting Fri 4/27/2018, Historical Med      lamoTRIgine (LaMICtal) 25 mg tablet Starting Tue 5/22/2018, Historical Med      LORazepam (ATIVAN) 1 mg tablet Starting Mon 5/21/2018, Historical Med      omeprazole (PriLOSEC) 20 mg delayed release capsule Take 20 mg by mouth daily, Historical Med      Ospemifene 60 MG TABS Take 1 tablet (60 mg total) by mouth daily, Starting Fri 9/28/2018, Normal      cetirizine (ZyrTEC) 10 mg tablet Take 10 mg by mouth daily, Historical Med      estradiol (ESTRACE) 0 1 mg/g vaginal cream Insert 1 g into the vagina 2 (two) times a week, Starting Mon 9/24/2018, Normal           No discharge procedures on file      ED Provider  Electronically Signed by           Charity Bowser DO  12/24/18 4330

## 2018-12-21 NOTE — DISCHARGE INSTRUCTIONS
Anxiety   WHAT YOU NEED TO KNOW:   What do I need to know about anxiety? Anxiety is a condition that causes you to feel extremely worried or nervous  The feelings are so strong that they can cause problems with your daily activities or sleep  Anxiety may be triggered by something you fear, or it may happen without a cause  Family or work stress, smoking, caffeine, and alcohol can increase your risk for anxiety  Certain medicines or health conditions can also increase your risk  Anxiety can become a long-term condition if it is not managed or treated  What other common signs and symptoms may occur with anxiety? · Fatigue or muscle tightness     · Shaking, restlessness, or irritability     · Problems focusing     · Trouble sleeping     · Feeling jumpy, easily startled, or dizzy     · Rapid heartbeat or shortness of breath  What do I need to tell my healthcare provider about my anxiety? Tell your healthcare provider when your symptoms began and what triggers them  Tell your provider if anxiety affects your daily activities  Your provider will also ask about your medical history and if you have family members with a similar condition  Tell your provider about your past and present alcohol, nicotine, or drug use  What can I do to manage anxiety? You may get medicines to help you feel calm and relaxed, and to decrease your symptoms  Medicines are usually given together with therapy or other treatments  The following can help you manage anxiety:  · Talk to someone about your anxiety  Your healthcare provider may suggest counseling  Cognitive behavioral therapy can help you understand and change how you react to events that trigger your symptoms  You might feel more comfortable talking with a friend or family member about your anxiety  Choose someone you know will be supportive and encouraging  · Find ways to relax  Activities such as exercise, meditation, or listening to music can help you relax   Spend time with friends, or do things you enjoy  · Practice deep breathing  Deep breathing can help you relax when you feel anxious  Focus on taking slow, deep breaths several times a day, or during an anxiety attack  Breathe in through your nose and out through your mouth  · Create a regular sleep routine  Regular sleep can help you feel calmer during the day  Go to sleep and wake up at the same times every day  Do not watch television or use the computer right before bed  Your room should be comfortable, dark, and quiet  · Eat a variety of healthy foods  Healthy foods include fruits, vegetables, low-fat dairy products, lean meats, fish, whole-grain breads, and cooked beans  Healthy foods can help you feel less anxious and have more energy  · Exercise regularly  Exercise can increase your energy level  Exercise may also lift your mood and help you sleep better  Your healthcare provider can help you create an exercise plan  · Do not smoke  Nicotine and other chemicals in cigarettes and cigars can increase anxiety  Ask your healthcare provider for information if you currently smoke and need help to quit  E-cigarettes or smokeless tobacco still contain nicotine  Talk to your healthcare provider before you use these products  · Do not have caffeine  Caffeine can make your symptoms worse  Do not have foods or drinks that are meant to increase your energy level  · Limit or do not drink alcohol  Ask your healthcare provider if alcohol is safe for you  You may not be able to drink alcohol if you take certain anxiety or depression medicines  Limit alcohol to 1 drink per day if you are a woman  Limit alcohol to 2 drinks per day if you are a man  A drink of alcohol is 12 ounces of beer, 5 ounces of wine, or 1½ ounces of liquor  · Do not use drugs  Drugs can make your anxiety worse  It can also make anxiety hard to manage  Talk to your healthcare provider if you use drugs and want help to quit    Call 911 if:   · You have chest pain, tightness, or heaviness that may spread to your shoulders, arms, jaw, neck, or back  · You feel like hurting yourself or someone else  When should I contact my healthcare provider? · Your symptoms get worse or do not get better with treatment  · Your anxiety keeps you from doing your regular daily activities  · You have new symptoms since your last visit  · You have questions or concerns about your condition or care  CARE AGREEMENT:   You have the right to help plan your care  Learn about your health condition and how it may be treated  Discuss treatment options with your caregivers to decide what care you want to receive  You always have the right to refuse treatment  The above information is an  only  It is not intended as medical advice for individual conditions or treatments  Talk to your doctor, nurse or pharmacist before following any medical regimen to see if it is safe and effective for you  © 2017 [employer] Information is for End User's use only and may not be sold, redistributed or otherwise used for commercial purposes  All illustrations and images included in CareNotes® are the copyrighted property of [employer]  or [employer]  Depression   WHAT YOU NEED TO KNOW:   Depression is a medical condition that causes feelings of sadness or hopelessness that do not go away  Depression may cause you to lose interest in things you used to enjoy  These feelings may interfere with your daily life  DISCHARGE INSTRUCTIONS:   Call 911 for any of the following:   · You think about harming yourself or someone else  Contact your healthcare provider if:   · Your symptoms do not improve  · You cannot make it to your next appointment  · You have new symptoms  · You have questions or concerns about your condition or care  Medicines:   · Antidepressants  may be given to improve or balance your mood   You may need to take this medicine for several weeks before you begin to feel better  · Take your medicine as directed  Contact your healthcare provider if you think your medicine is not helping or if you have side effects  Tell him of her if you are allergic to any medicine  Keep a list of the medicines, vitamins, and herbs you take  Include the amounts, and when and why you take them  Bring the list or the pill bottles to follow-up visits  Carry your medicine list with you in case of an emergency  Therapy  may be used to treat your depression  A therapist will help you learn to cope with your thoughts and feelings  This can be done alone or in a group  It may also be done with family members or a significant other  Self-care:   · Get regular physical activity  Try to exercise for 30 minutes, 3 to 5 days a week  Work with your healthcare provider to develop an exercise plan that you enjoy  Physical activity may improve your symptoms  · Get enough sleep  Create a routine to help you relax before bed  You can listen to music, read, or do yoga  Try to go to bed and wake up at the same time every day  Sleep is important for emotional health  · Eat a variety of healthy foods from all of the food groups  A healthy meal plan is low in fat, salt, and added sugar  Ask your healthcare provider for more information about a meal plan that is right for you  · Do not drink alcohol or use drugs  Alcohol and drugs can make your symptoms worse  Follow up with your healthcare provider as directed: Your healthcare provider will monitor your progress at follow-up visits  He or she will also monitor your medicine if you take antidepressants  Your healthcare provider will ask if the medicine is helping  Tell him or her about any side effects or problems you may have with your medicine  The type or amount of medicine may need to be changed  Write down your questions so you remember to ask them during your visits    © 2017 [address] [facility] Information is for End User's use only and may not be sold, redistributed or otherwise used for commercial purposes  All illustrations and images included in CareNotes® are the copyrighted property of [employer]  or [employer]  The above information is an  only  It is not intended as medical advice for individual conditions or treatments  Talk to your doctor, nurse or pharmacist before following any medical regimen to see if it is safe and effective for you  Panic Disorder   WHAT YOU NEED TO KNOW:   What is panic disorder? Panic disorder is a type of anxiety disorder that causes you to have sudden panic attacks  The panic attacks may occur anywhere at any time, even while you are asleep  You may begin to worry when the panic attacks will happen again  Your behavior may change, and you may not want to go out with family and friends  What is a panic attack? A panic attack is a period of strong fear or discomfort  You may feel as though something very bad is going to happen but do not know what it is  A panic attack occurs suddenly and usually lasts about 10 minutes  It may occur at any time and without warning  You may also have the following symptoms during a panic attack:  · Fast or pounding heartbeat    · Sweating, trembling, or shaking    · Shortness of breath or trouble breathing    · Feeling of choking or having a lump in your throat    · Chest pain or discomfort    · Nausea or abdominal pain    · Feeling dizzy, unsteady, lightheaded, or faint    · Feeling that you or the things around you are not real, or you are outside of your body    · Fear of losing control, going crazy, or dying    · Numbness or a tingling feeling    · Chills or hot flushes  What causes or increases my risk of panic disorder? No one knows for sure what causes panic disorder  You may have an increased risk of panic disorder if other family members have it   The following may also increase your risk:  · Stressful events such as severe illness or injury, death of a loved one, or childhood trauma such as physical or sexual abuse    · Chronic medical conditions such as asthma, lung disease, irritable bowel syndrome, heart problems, or thyroid disease    · Other mental health conditions such as depression or another type of anxiety disorder     · Overprotective parents or parents who worry too much about their own health    · Drug or alcohol abuse  How is panic disorder diagnosed and treated? Your healthcare provider will ask you how bad, how often, and in what situations your panic attacks occur  He may also want to know if other family members have panic disorder or other mental health conditions  He may also ask how well you are doing in school or work, or with your daily activities  You may be treated with any of the following:  · Medicines , such as antianxiety and antidepressants, may be given to treat panic disorder  You may need to take antidepressants for several weeks before you begin to feel better  Tell your healthcare provider about any side effects or problems you have with your medicine  Sometimes the type or amount of medicine may need to be changed  · Therapy  may be used to treat panic disorder  A therapist will help you learn to cope with your thoughts and feelings  This can be done alone or in a group  It may also be done with family members or a significant other  How can I manage panic disorder? · Keep a diary of your panic attacks  Write down how often you have panic attacks, how long they last, and the symptoms you had  Write down whether or not there was anything that happened right before the panic attack  Write down whether there were things that helped to ease or stop your panic attack  Bring your diary with you every time you see your healthcare provider  · Avoid foods and drinks that have caffeine    These may include coffee, tea, soda, energy drinks, and chocolate  · Avoid or limit alcohol  Ask your healthcare provider how much alcohol is safe for you to drink  A drink of alcohol is 12 ounces of beer, 5 ounces of wine, or 1½ ounces of liquor  · Get regular physical activity  Exercise can help decrease stress and anxiety  Talk to your healthcare provider before you start to exercise  Together you can plan the best exercise program for you  · Manage your stress  Learn ways to control stress, such as relaxation or deep breathing  Talk to someone about things that upset you  When should I contact my healthcare provider? · You have new symptoms since you last saw your healthcare provider  · Your worry keeps you from doing daily tasks such as work or caring for yourself or your family  · Your symptoms are getting worse  · You have questions or concerns about your condition or care  When should I seek immediate care or call 911? · You feel lightheaded, too dizzy to stand up, or you faint  · You feel like harming yourself  · You have chest pain, tightness, or heaviness that spreads to your shoulders, arms, jaw, neck, or back  CARE AGREEMENT:   You have the right to help plan your care  Learn about your health condition and how it may be treated  Discuss treatment options with your caregivers to decide what care you want to receive  You always have the right to refuse treatment  The above information is an  only  It is not intended as medical advice for individual conditions or treatments  Talk to your doctor, nurse or pharmacist before following any medical regimen to see if it is safe and effective for you  © 2017 [employer] Information is for End User's use only and may not be sold, redistributed or otherwise used for commercial purposes  All illustrations and images included in CareNotes® are the copyrighted property of [employer]  or [employer] Lynne

## 2018-12-21 NOTE — ED NOTES
Pt became hysterical   hugging pt while she is crying  Pt started to hit herself        Luz López RN  12/21/18 [id number]

## 2018-12-21 NOTE — ED NOTES
Pt changed into blue scrubs  Belongings checked and locked in locker 20  Significant other at bedside  Pt tearful         Sharmin Sarabia  12/21/18 [id number]

## 2018-12-22 LAB
ATRIAL RATE: 97 BPM
P AXIS: 60 DEGREES
PR INTERVAL: 112 MS
QRS AXIS: 74 DEGREES
QRSD INTERVAL: 80 MS
QT INTERVAL: 336 MS
QTC INTERVAL: 426 MS
T WAVE AXIS: 68 DEGREES
VENTRICULAR RATE: 97 BPM

## 2018-12-22 PROCEDURE — 93010 ELECTROCARDIOGRAM REPORT: CPT | Performed by: INTERNAL MEDICINE

## 2018-12-31 ENCOUNTER — TELEPHONE (OUTPATIENT)
Dept: OBGYN CLINIC | Facility: CLINIC | Age: 58
End: 2018-12-31

## 2018-12-31 NOTE — TELEPHONE ENCOUNTER
Pt said she went to er due to a panic attack and she said she believes its from the osphena, told pt I would let you know, she did stop it

## 2019-01-03 ENCOUNTER — HOSPITAL ENCOUNTER (OUTPATIENT)
Dept: RADIOLOGY | Age: 59
Discharge: HOME | End: 2019-01-03
Payer: COMMERCIAL

## 2019-01-03 VITALS — WEIGHT: 180 LBS | HEIGHT: 61 IN | BODY MASS INDEX: 33.99 KG/M2

## 2019-01-03 DIAGNOSIS — Z12.31 ENCOUNTER FOR SCREENING MAMMOGRAM FOR MALIGNANT NEOPLASM OF BREAST: ICD-10-CM

## 2019-01-03 PROCEDURE — 77063 BREAST TOMOSYNTHESIS BI: CPT

## 2019-01-03 PROCEDURE — 77067 SCR MAMMO BI INCL CAD: CPT

## 2019-10-01 ENCOUNTER — ANNUAL EXAM (OUTPATIENT)
Dept: OBGYN CLINIC | Facility: CLINIC | Age: 59
End: 2019-10-01
Payer: COMMERCIAL

## 2019-10-01 VITALS
WEIGHT: 187 LBS | SYSTOLIC BLOOD PRESSURE: 128 MMHG | DIASTOLIC BLOOD PRESSURE: 78 MMHG | HEIGHT: 61 IN | BODY MASS INDEX: 35.3 KG/M2

## 2019-10-01 DIAGNOSIS — Z01.419 ENCNTR FOR GYN EXAM (GENERAL) (ROUTINE) W/O ABN FINDINGS: ICD-10-CM

## 2019-10-01 DIAGNOSIS — Z12.31 ENCOUNTER FOR SCREENING MAMMOGRAM FOR MALIGNANT NEOPLASM OF BREAST: ICD-10-CM

## 2019-10-01 PROBLEM — E78.5 HYPERLIPIDEMIA: Status: ACTIVE | Noted: 2018-04-16

## 2019-10-01 PROCEDURE — S0612 ANNUAL GYNECOLOGICAL EXAMINA: HCPCS | Performed by: PHYSICIAN ASSISTANT

## 2019-10-01 PROCEDURE — G0145 SCR C/V CYTO,THINLAYER,RESCR: HCPCS | Performed by: PHYSICIAN ASSISTANT

## 2019-10-01 PROCEDURE — 87624 HPV HI-RISK TYP POOLED RSLT: CPT | Performed by: PHYSICIAN ASSISTANT

## 2019-10-01 RX ORDER — ALPRAZOLAM 0.25 MG/1
TABLET ORAL
Refills: 0 | COMMUNITY
Start: 2019-08-02

## 2019-10-01 RX ORDER — OXCARBAZEPINE 150 MG/1
TABLET, FILM COATED ORAL
Refills: 0 | COMMUNITY
Start: 2019-09-27

## 2019-10-01 RX ORDER — CLONAZEPAM 0.5 MG/1
TABLET ORAL
Refills: 0 | COMMUNITY
Start: 2019-08-30 | End: 2021-06-24

## 2019-10-01 NOTE — PROGRESS NOTES
Sandy Reese   1960    CC:  Yearly exam    S:  66 y o  female here for yearly exam  She is postmenopausal and has had no vaginal bleeding  She denies vaginal discharge, itching, odor or dryness  Sexual activity: She is not sexually active with her , Ana Maria Rivas, because of his MS and his erectile dysfunction and her vaginal dryness  STD testing: She does not want STD testing today  Last Pap: 7/8/14 neg/neg  Last Mammo:  1/3/19 neg  Last Colonoscopy: 2015 (repeat 5 years  Last DEXA: never    We reviewed ASC guidelines for Pap testing       Family hx of breast cancer: no  Family hx of ovarian cancer:  no  Family hx of colon cancer: father    Current Outpatient Medications:     cetirizine (ZyrTEC) 10 mg tablet, Take 10 mg by mouth daily, Disp: , Rfl:     escitalopram (LEXAPRO) 10 mg tablet, Take 10 mg by mouth every morning, Disp: , Rfl: 0    estradiol (ESTRACE) 0 1 mg/g vaginal cream, Insert 1 g into the vagina 2 (two) times a week, Disp: 42 5 g, Rfl: 3    lamoTRIgine (LaMICtal) 25 mg tablet, , Disp: , Rfl: 0    LORazepam (ATIVAN) 1 mg tablet, , Disp: , Rfl: 0    omeprazole (PriLOSEC) 20 mg delayed release capsule, Take 20 mg by mouth daily, Disp: , Rfl:     Ospemifene 60 MG TABS, Take 1 tablet (60 mg total) by mouth daily, Disp: 90 tablet, Rfl: 3  Social History     Socioeconomic History    Marital status: /Civil Union     Spouse name: Not on file    Number of children: Not on file    Years of education: Not on file    Highest education level: Not on file   Occupational History    Not on file   Social Needs    Financial resource strain: Not on file    Food insecurity:     Worry: Not on file     Inability: Not on file    Transportation needs:     Medical: Not on file     Non-medical: Not on file   Tobacco Use    Smoking status: Never Smoker    Smokeless tobacco: Never Used   Substance and Sexual Activity    Alcohol use: Yes     Comment: occ    Drug use: No    Sexual activity: Yes     Partners: Male   Lifestyle    Physical activity:     Days per week: Not on file     Minutes per session: Not on file    Stress: Not on file   Relationships    Social connections:     Talks on phone: Not on file     Gets together: Not on file     Attends Hinduism service: Not on file     Active member of club or organization: Not on file     Attends meetings of clubs or organizations: Not on file     Relationship status: Not on file    Intimate partner violence:     Fear of current or ex partner: Not on file     Emotionally abused: Not on file     Physically abused: Not on file     Forced sexual activity: Not on file   Other Topics Concern    Not on file   Social History Narrative    Partner had vasectomy     Family History   Problem Relation Age of Onset    Hypertension Mother     Osteoporosis Mother     Dementia Mother     Heart disease Father         cardiac disorder , colon cancer, diabetes    Colon cancer Father 61    Thyroid disease Sister     Cancer Brother         bladder cancer     Past Medical History:   Diagnosis Date    Basal cell adenocarcinoma     Depression     last assessed 06/10/2014        Review of Systems   Respiratory: Negative  Cardiovascular: Negative  Gastrointestinal: Negative for constipation and diarrhea  Genitourinary: Negative for difficulty urinating, pelvic pain, vaginal bleeding, vaginal discharge, itching or odor  O:  There were no vitals taken for this visit  Patient appears well and is not in distress  Neck is supple without masses  Breasts are symmetrical without mass, tenderness, nipple discharge, skin changes or adenopathy  Abdomen is soft and nontender without masses  External genitals are normal without lesions or rashes  Urethral meatus and urethra are normal  Bladder is normal to palpation  Vagina is normal without discharge or bleeding  Atrophic  Cervix is normal without discharge or lesion     Uterus is normal, mobile, nontender without palpable mass  Adnexa are normal, nontender, without palpable mass  A:  Yearly exam      P:   Pap and HPV today   Mammo slip given   Colonoscopy due 2020    RTO one year for yearly exam or sooner as needed

## 2019-10-02 LAB
HPV HR 12 DNA CVX QL NAA+PROBE: NEGATIVE
HPV16 DNA CVX QL NAA+PROBE: NEGATIVE
HPV18 DNA CVX QL NAA+PROBE: NEGATIVE

## 2019-10-07 LAB
LAB AP GYN PRIMARY INTERPRETATION: NORMAL
Lab: NORMAL

## 2020-01-27 ENCOUNTER — TELEPHONE (OUTPATIENT)
Dept: OBGYN CLINIC | Facility: CLINIC | Age: 60
End: 2020-01-27

## 2020-01-27 NOTE — TELEPHONE ENCOUNTER
Left message advising pt to call [phone] to schedule her 2020 mammo  Last one on file was 1/3/19

## 2020-01-29 ENCOUNTER — HOSPITAL ENCOUNTER (EMERGENCY)
Facility: HOSPITAL | Age: 60
Discharge: HOME | End: 2020-01-29
Attending: EMERGENCY MEDICINE | Admitting: EMERGENCY MEDICINE
Payer: COMMERCIAL

## 2020-01-29 VITALS
BODY MASS INDEX: 33.53 KG/M2 | DIASTOLIC BLOOD PRESSURE: 80 MMHG | HEART RATE: 78 BPM | WEIGHT: 177.47 LBS | TEMPERATURE: 98.4 F | SYSTOLIC BLOOD PRESSURE: 135 MMHG | RESPIRATION RATE: 16 BRPM | OXYGEN SATURATION: 97 %

## 2020-01-29 DIAGNOSIS — R55 VASOVAGAL SYNCOPE: ICD-10-CM

## 2020-01-29 DIAGNOSIS — R19.7 DIARRHEA: Primary | ICD-10-CM

## 2020-01-29 LAB
ALBUMIN SERPL BCP-MCNC: 4.5 G/DL (ref 3.5–5)
ALP SERPL-CCNC: 94 U/L (ref 46–116)
ALT SERPL W P-5'-P-CCNC: 31 U/L (ref 12–78)
ANION GAP SERPL CALCULATED.3IONS-SCNC: 9 MMOL/L (ref 4–13)
AST SERPL W P-5'-P-CCNC: 20 U/L (ref 5–45)
BASOPHILS # BLD AUTO: 0.03 THOUSANDS/ΜL (ref 0–0.1)
BASOPHILS NFR BLD AUTO: 0 % (ref 0–1)
BILIRUB SERPL-MCNC: 0.43 MG/DL (ref 0.2–1)
BUN SERPL-MCNC: 12 MG/DL (ref 5–25)
CALCIUM SERPL-MCNC: 9.2 MG/DL (ref 8.3–10.1)
CHLORIDE SERPL-SCNC: 101 MMOL/L (ref 100–108)
CO2 SERPL-SCNC: 27 MMOL/L (ref 21–32)
CREAT SERPL-MCNC: 0.98 MG/DL (ref 0.6–1.3)
EOSINOPHIL # BLD AUTO: 0.07 THOUSAND/ΜL (ref 0–0.61)
EOSINOPHIL NFR BLD AUTO: 1 % (ref 0–6)
ERYTHROCYTE [DISTWIDTH] IN BLOOD BY AUTOMATED COUNT: 12.1 % (ref 11.6–15.1)
GFR SERPL CREATININE-BSD FRML MDRD: 64 ML/MIN/1.73SQ M
GLUCOSE SERPL-MCNC: 102 MG/DL (ref 65–140)
HCT VFR BLD AUTO: 47 % (ref 34.8–46.1)
HGB BLD-MCNC: 15.4 G/DL (ref 11.5–15.4)
IMM GRANULOCYTES # BLD AUTO: 0.02 THOUSAND/UL (ref 0–0.2)
IMM GRANULOCYTES NFR BLD AUTO: 0 % (ref 0–2)
LYMPHOCYTES # BLD AUTO: 1.36 THOUSANDS/ΜL (ref 0.6–4.47)
LYMPHOCYTES NFR BLD AUTO: 17 % (ref 14–44)
MCH RBC QN AUTO: 30.6 PG (ref 26.8–34.3)
MCHC RBC AUTO-ENTMCNC: 32.8 G/DL (ref 31.4–37.4)
MCV RBC AUTO: 93 FL (ref 82–98)
MONOCYTES # BLD AUTO: 0.51 THOUSAND/ΜL (ref 0.17–1.22)
MONOCYTES NFR BLD AUTO: 6 % (ref 4–12)
NEUTROPHILS # BLD AUTO: 6.06 THOUSANDS/ΜL (ref 1.85–7.62)
NEUTS SEG NFR BLD AUTO: 76 % (ref 43–75)
NRBC BLD AUTO-RTO: 0 /100 WBCS
PLATELET # BLD AUTO: 267 THOUSANDS/UL (ref 149–390)
PMV BLD AUTO: 9.3 FL (ref 8.9–12.7)
POTASSIUM SERPL-SCNC: 4 MMOL/L (ref 3.5–5.3)
PROT SERPL-MCNC: 7.9 G/DL (ref 6.4–8.2)
RBC # BLD AUTO: 5.04 MILLION/UL (ref 3.81–5.12)
SODIUM SERPL-SCNC: 137 MMOL/L (ref 136–145)
TROPONIN I SERPL-MCNC: <0.02 NG/ML
WBC # BLD AUTO: 8.05 THOUSAND/UL (ref 4.31–10.16)

## 2020-01-29 PROCEDURE — 85025 COMPLETE CBC W/AUTO DIFF WBC: CPT | Performed by: EMERGENCY MEDICINE

## 2020-01-29 PROCEDURE — 99284 EMERGENCY DEPT VISIT MOD MDM: CPT

## 2020-01-29 PROCEDURE — 93005 ELECTROCARDIOGRAM TRACING: CPT

## 2020-01-29 PROCEDURE — 87505 NFCT AGENT DETECTION GI: CPT | Performed by: EMERGENCY MEDICINE

## 2020-01-29 PROCEDURE — 80053 COMPREHEN METABOLIC PANEL: CPT | Performed by: EMERGENCY MEDICINE

## 2020-01-29 PROCEDURE — 84484 ASSAY OF TROPONIN QUANT: CPT | Performed by: EMERGENCY MEDICINE

## 2020-01-29 PROCEDURE — 36415 COLL VENOUS BLD VENIPUNCTURE: CPT | Performed by: EMERGENCY MEDICINE

## 2020-01-29 PROCEDURE — 99283 EMERGENCY DEPT VISIT LOW MDM: CPT | Performed by: EMERGENCY MEDICINE

## 2020-01-29 NOTE — DISCHARGE INSTRUCTIONS
Please decrease your dose of miralax by half to see if this leads to an improvement in your diarrhea

## 2020-01-29 NOTE — ED PROVIDER NOTES
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 dizziness/lightheadedness after multiple episodes of liquid diarrhea today  Pt became very dizzy and placed herself onto the bathroom floor where another  found her  Pt also c/o abdominal pain     HPI     37-year-old female with history of depression and bipolar disorder presenting for evaluation of episode of presyncope  Patient states that she was recently started on MiraLax by her GI doctor  She was at work where she works as a  when she developed some generalized abdominal cramping  She went into the bathroom, had a few episodes of diarrhea, and became clammy and lightheaded like she was going to pass out  She laid down on the bathroom floor and did not actually lose consciousness  Her abdominal pain completely resolved after having a bowel movement  Was described as cramping  No nausea or vomiting  Denies fevers or chills  No history of abdominal surgeries  Patient no longer feels dizzy or lightheaded  Denies chest pain, shortness of breath, or palpitations  States that she would have come to the hospital but her coworkers called EMS and insisted  Prior to Admission Medications   Prescriptions Last Dose Informant Patient Reported? Taking?    ALPRAZolam (XANAX) 0 25 mg tablet  Self Yes No   Sig: take 1-2 tablets by mouth daily if needed   OXcarbazepine (TRILEPTAL) 150 mg tablet  Self Yes No   clonazePAM (KlonoPIN) 0 5 mg tablet  Self Yes No   escitalopram (LEXAPRO) 10 mg tablet  Self Yes No   Sig: Take 10 mg by mouth every morning   omeprazole (PriLOSEC) 20 mg delayed release capsule  Self Yes No   Sig: Take 20 mg by mouth daily      Facility-Administered Medications: None       Past Medical History:   Diagnosis Date    Basal cell adenocarcinoma     Bipolar 1 disorder ([facility] [address] )     Depression     last assessed 06/10/2014       Past Surgical History:   Procedure Laterality Date     SECTION      COLONOSCOPY      HIATAL HERNIA REPAIR  2017    KNEE SURGERY      left , right    MOHS SURGERY         Family History   Problem Relation Age of Onset    Hypertension Mother     Osteoporosis Mother     Dementia Mother     Heart disease Father         cardiac disorder , colon cancer, diabetes    Colon cancer Father 61    Thyroid disease Sister     Cancer Brother         bladder cancer    Thyroid disease Daughter      I have reviewed and agree with the history as documented  Social History     Tobacco Use    Smoking status: Never Smoker    Smokeless tobacco: Never Used   Substance Use Topics    Alcohol use: Yes     Comment: occ    Drug use: No        Review of Systems   Constitutional: Negative for chills and fever  HENT: Negative for congestion  Eyes: Negative for visual disturbance  Respiratory: Negative for cough and shortness of breath  Cardiovascular: Negative for chest pain and leg swelling  Gastrointestinal: Positive for abdominal pain (resolved) and diarrhea  Negative for nausea and vomiting  Genitourinary: Negative for dysuria and frequency  Musculoskeletal: Negative for arthralgias, back pain, neck pain and neck stiffness  Skin: Negative for rash  Neurological: Positive for dizziness (resolved)  Negative for weakness, numbness and headaches  Psychiatric/Behavioral: Negative for agitation, behavioral problems and confusion  Physical Exam  Physical Exam   Constitutional: She is oriented to person, place, and time  She appears well-developed and well-nourished  No distress  HENT:   Head: Normocephalic and atraumatic  Right Ear: External ear normal    Left Ear: External ear normal    Nose: Nose normal    Mouth/Throat: Oropharynx is clear and moist    Eyes: Conjunctivae are normal    Neck: Normal range of motion  Neck supple     Cardiovascular: Normal rate, regular rhythm, normal heart sounds and intact distal pulses  Exam reveals no gallop and no friction rub  No murmur heard  Pulmonary/Chest: Effort normal and breath sounds normal  No respiratory distress  She has no wheezes  She has no rales  Abdominal: Soft  Bowel sounds are normal  She exhibits no distension  There is no tenderness  There is no guarding  Abdomen benign   Musculoskeletal: Normal range of motion  She exhibits no edema or deformity  Neurological: She is alert and oriented to person, place, and time  She exhibits normal muscle tone  Skin: Skin is warm and dry  She is not diaphoretic  Vital Signs  ED Triage Vitals [01/29/20 1005]   Temperature Pulse Respirations Blood Pressure SpO2   98 4 °F (36 9 °C) 85 19 143/85 98 %      Temp Source Heart Rate Source Patient Position - Orthostatic VS BP Location FiO2 (%)   Oral Monitor Lying Right arm --      Pain Score       5           Vitals:    01/29/20 1005 01/29/20 1145   BP: 143/85 135/80   Pulse: 85 78   Patient Position - Orthostatic VS: Lying Lying         Visual Acuity      ED Medications  Medications - No data to display    Diagnostic Studies  Results Reviewed     Procedure Component Value Units Date/Time    Clostridium difficile toxin by PCR with EIA [[id number]] Collected:  01/29/20 1129    Lab Status: In process Specimen:  Stool from Per Rectum Updated:  01/29/20 1142    Stool Enteric Bacterial Panel by PCR [[id number]] Collected:  01/29/20 1129    Lab Status:   In process Specimen:  Stool from Rectum Updated:  01/29/20 1142    Troponin I [[id number]]  (Normal) Collected:  01/29/20 1056    Lab Status:  Final result Specimen:  Blood from Arm, Right Updated:  01/29/20 1122     Troponin I <0 02 ng/mL     Comprehensive metabolic panel [[id number]] Collected:  01/29/20 1056    Lab Status:  Final result Specimen:  Blood from Arm, Right Updated:  01/29/20 1120     Sodium 137 mmol/L      Potassium 4 0 mmol/L      Chloride 101 mmol/L      CO2 27 mmol/L      ANION GAP 9 mmol/L      BUN 12 mg/dL      Creatinine 0 98 mg/dL      Glucose 102 mg/dL      Calcium 9 2 mg/dL      AST 20 U/L      ALT 31 U/L      Alkaline Phosphatase 94 U/L      Total Protein 7 9 g/dL      Albumin 4 5 g/dL      Total Bilirubin 0 43 mg/dL      eGFR 64 ml/min/1 73sq m     Narrative:       National Kidney Disease Foundation guidelines for Chronic Kidney Disease (CKD):     Stage 1 with normal or high GFR (GFR > 90 mL/min/1 73 square meters)    Stage 2 Mild CKD (GFR = 60-89 mL/min/1 73 square meters)    Stage 3A Moderate CKD (GFR = 45-59 mL/min/1 73 square meters)    Stage 3B Moderate CKD (GFR = 30-44 mL/min/1 73 square meters)    Stage 4 Severe CKD (GFR = 15-29 mL/min/1 73 square meters)    Stage 5 End Stage CKD (GFR <15 mL/min/1 73 square meters)  Note: GFR calculation is accurate only with a steady state creatinine    CBC and differential [[id number]]  (Abnormal) Collected:  01/29/20 1056    Lab Status:  Final result Specimen:  Blood from Arm, Right Updated:  01/29/20 1106     WBC 8 05 Thousand/uL      RBC 5 04 Million/uL      Hemoglobin 15 4 g/dL      Hematocrit 47 0 %      MCV 93 fL      MCH 30 6 pg      MCHC 32 8 g/dL      RDW 12 1 %      MPV 9 3 fL      Platelets 352 Thousands/uL      nRBC 0 /100 WBCs      Neutrophils Relative 76 %      Immat GRANS % 0 %      Lymphocytes Relative 17 %      Monocytes Relative 6 %      Eosinophils Relative 1 %      Basophils Relative 0 %      Neutrophils Absolute 6 06 Thousands/µL      Immature Grans Absolute 0 02 Thousand/uL      Lymphocytes Absolute 1 36 Thousands/µL      Monocytes Absolute 0 51 Thousand/µL      Eosinophils Absolute 0 07 Thousand/µL      Basophils Absolute 0 03 Thousands/µL                  No orders to display              Procedures  Procedures         ED Course                               MDM  Number of Diagnoses or Management Options  Diarrhea: new and requires workup  Vasovagal syncope: new and requires workup  Diagnosis management comments:   Generally well appearing  Afebrile and hemodynamically stable  Patient states that she is now completely asymptomatic  Episode of presyncope occurred while patient was bearing down to have a bowel movement, she did not actually lose consciousness  Did not have chest pain, shortness of breath, or palpitations  I personally interpreted her EKG which reveals normal rate, normal sinus rhythm, normal axis, normal intervals, no ischemic changes, no findings that would be significant for WPW, Brugada, or HOCM  Troponin obtained in the setting of risk stratification with presyncope that is not elevated, doubt underlying cardiac etiology  HEART score not calculated in the absence of chest pain  CBC and CMP unremarkable  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, this may be the cause of her symptoms, although she also tells me that she was recently started on MiraLax by her GI doctor so this could also be the cause of her diarrhea  Recommend decreasing her dose of MiraLax by half, and follow up with her GI doctor if symptoms continue  Return precautions discussed and patient discharged in good condition  Amount and/or Complexity of Data Reviewed  Clinical lab tests: ordered and reviewed  Independent visualization of images, tracings, or specimens: yes    Patient Progress  Patient progress: resolved        Disposition  Final diagnoses:   Diarrhea   Vasovagal syncope     Time reflects when diagnosis was documented in both MDM as applicable and the Disposition within this note     Time User Action Codes Description Comment    1/29/2020 11:34 AM Zhen Araujo Add [R19 7] Diarrhea     1/29/2020 11:34 AM Zhen Araujo Add [R55] Vasovagal syncope       ED Disposition     ED Disposition Condition Date/Time Comment    Discharge Stable Wed Jan 29, 2020 11:34 AM Ricardo Kelly discharge to home/self care              Follow-up Information     Follow up With Specialties Details Why Contact Info Additional [facility] Emergency Medicine  As we discussed, return to the Emergency Department for severe abdominal pain, vomiting with inability to tolerate oral intake, or repeat episodes of feeling like you are going to pass out  [address] [facility] [address] [phone] [facility] ED, [address], [place], [place], [address]          Discharge Medication List as of 1/29/2020 11:36 AM      CONTINUE these medications which have NOT CHANGED    Details   ALPRAZolam (XANAX) 0 25 mg tablet take 1-2 tablets by mouth daily if needed, Historical Med      clonazePAM (KlonoPIN) 0 5 mg tablet Starting Fri 8/30/2019, Historical Med      escitalopram (LEXAPRO) 10 mg tablet Take 10 mg by mouth every morning, Starting Fri 4/27/2018, Historical Med      omeprazole (PriLOSEC) 20 mg delayed release capsule Take 20 mg by mouth daily, Historical Med      OXcarbazepine (TRILEPTAL) 150 mg tablet Starting Fri 9/27/2019, Historical Med           No discharge procedures on file      ED Provider  Electronically Signed by           Sonja Galeana MD  01/29/20 8426

## 2020-01-30 LAB
ATRIAL RATE: 71 BPM
C DIFF TOX GENS STL QL NAA+PROBE: NEGATIVE
CAMPYLOBACTER DNA SPEC NAA+PROBE: NORMAL
P AXIS: 56 DEGREES
PR INTERVAL: 146 MS
QRS AXIS: 74 DEGREES
QRSD INTERVAL: 82 MS
QT INTERVAL: 388 MS
QTC INTERVAL: 421 MS
SALMONELLA DNA SPEC QL NAA+PROBE: NORMAL
SHIGA TOXIN STX GENE SPEC NAA+PROBE: NORMAL
SHIGELLA DNA SPEC QL NAA+PROBE: NORMAL
T WAVE AXIS: 66 DEGREES
VENTRICULAR RATE: 71 BPM

## 2020-01-30 PROCEDURE — 93010 ELECTROCARDIOGRAM REPORT: CPT | Performed by: INTERNAL MEDICINE

## 2020-03-24 ENCOUNTER — TELEPHONE (OUTPATIENT)
Dept: OBGYN CLINIC | Facility: CLINIC | Age: 60
End: 2020-03-24

## 2020-11-11 ENCOUNTER — TRANSCRIBE ORDERS (OUTPATIENT)
Dept: ADMINISTRATIVE | Facility: HOSPITAL | Age: 60
End: 2020-11-11

## 2020-11-11 DIAGNOSIS — Z12.31 SCREENING MAMMOGRAM FOR HIGH-RISK PATIENT: Primary | ICD-10-CM

## 2020-11-12 ENCOUNTER — HOSPITAL ENCOUNTER (OUTPATIENT)
Dept: RADIOLOGY | Age: 60
Discharge: HOME | End: 2020-11-12
Payer: COMMERCIAL

## 2020-11-12 VITALS — WEIGHT: 170 LBS | BODY MASS INDEX: 31.28 KG/M2 | HEIGHT: 62 IN

## 2020-11-12 DIAGNOSIS — Z12.31 SCREENING MAMMOGRAM FOR HIGH-RISK PATIENT: ICD-10-CM

## 2020-11-12 PROCEDURE — 77067 SCR MAMMO BI INCL CAD: CPT

## 2020-11-12 PROCEDURE — 77063 BREAST TOMOSYNTHESIS BI: CPT

## 2021-02-19 DIAGNOSIS — Z23 ENCOUNTER FOR IMMUNIZATION: ICD-10-CM

## 2021-02-28 ENCOUNTER — IMMUNIZATIONS (OUTPATIENT)
Dept: FAMILY MEDICINE CLINIC | Facility: HOSPITAL | Age: 61
End: 2021-02-28

## 2021-02-28 DIAGNOSIS — Z23 ENCOUNTER FOR IMMUNIZATION: Primary | ICD-10-CM

## 2021-02-28 PROCEDURE — 91300 SARS-COV-2 / COVID-19 MRNA VACCINE (PFIZER-BIONTECH) 30 MCG: CPT

## 2021-02-28 PROCEDURE — 0001A SARS-COV-2 / COVID-19 MRNA VACCINE (PFIZER-BIONTECH) 30 MCG: CPT

## 2021-03-21 ENCOUNTER — IMMUNIZATIONS (OUTPATIENT)
Dept: FAMILY MEDICINE CLINIC | Facility: HOSPITAL | Age: 61
End: 2021-03-21

## 2021-03-21 DIAGNOSIS — Z23 ENCOUNTER FOR IMMUNIZATION: Primary | ICD-10-CM

## 2021-03-21 PROCEDURE — 91300 SARS-COV-2 / COVID-19 MRNA VACCINE (PFIZER-BIONTECH) 30 MCG: CPT

## 2021-03-21 PROCEDURE — 0002A SARS-COV-2 / COVID-19 MRNA VACCINE (PFIZER-BIONTECH) 30 MCG: CPT

## 2021-03-27 ENCOUNTER — HOSPITAL ENCOUNTER (EMERGENCY)
Facility: HOSPITAL | Age: 61
Discharge: HOME | End: 2021-03-27
Attending: EMERGENCY MEDICINE | Admitting: EMERGENCY MEDICINE
Payer: COMMERCIAL

## 2021-03-27 VITALS
HEIGHT: 62 IN | TEMPERATURE: 98.4 F | WEIGHT: 180 LBS | RESPIRATION RATE: 16 BRPM | OXYGEN SATURATION: 98 % | SYSTOLIC BLOOD PRESSURE: 180 MMHG | DIASTOLIC BLOOD PRESSURE: 90 MMHG | HEART RATE: 75 BPM | BODY MASS INDEX: 33.13 KG/M2

## 2021-03-27 DIAGNOSIS — H57.89 IRRITATION OF LEFT EYE: Primary | ICD-10-CM

## 2021-03-27 DIAGNOSIS — H11.32 SUBCONJUNCTIVAL HEMORRHAGE OF LEFT EYE: ICD-10-CM

## 2021-03-27 PROCEDURE — 99284 EMERGENCY DEPT VISIT MOD MDM: CPT | Performed by: PHYSICIAN ASSISTANT

## 2021-03-27 PROCEDURE — 99283 EMERGENCY DEPT VISIT LOW MDM: CPT

## 2021-03-27 RX ORDER — TOBRAMYCIN 3 MG/ML
2 SOLUTION/ DROPS OPHTHALMIC ONCE
Status: COMPLETED | OUTPATIENT
Start: 2021-03-27 | End: 2021-03-27

## 2021-03-27 RX ORDER — TETRACAINE HYDROCHLORIDE 5 MG/ML
1 SOLUTION OPHTHALMIC ONCE
Status: COMPLETED | OUTPATIENT
Start: 2021-03-27 | End: 2021-03-27

## 2021-03-27 RX ADMIN — FLUORESCEIN SODIUM 1 STRIP: 1 STRIP OPHTHALMIC at 12:57

## 2021-03-27 RX ADMIN — TOBRAMYCIN 2 DROP: 3 SOLUTION OPHTHALMIC at 14:16

## 2021-03-27 RX ADMIN — TETRACAINE HYDROCHLORIDE 1 DROP: 5 SOLUTION OPHTHALMIC at 12:56

## 2021-03-27 NOTE — ED PROVIDER NOTES
History  Chief Complaint   Patient presents with    Foreign Body in Eye     Pt presents [facility] ED from [facility] after unable to get contact lens out of right eye  [facility] unable to get contact out, did not see it  Pt in pain  Pt (+) hx bipolar, anxiety     61-year-old female presents to the emergency department with complaints of left-sided eye irritation and retained foreign body  States she tried to get her contact lenses out of her eye this morning and was unable to get the left 1 out  Went to [facility] and had fluorescein stain applied as well as attempted removal with a small suction cup  States that they were unable to remove the contact lens and she was sent here for further evaluation  She denies any visual disturbances  No ocular pain  Presently complains of some irritation and tearing without purulent discharge  History provided by:  Patient   used: No    Foreign Body in Eye  Location:  L eye  Suspected object: Contact lens  Pain quality:  Unable to specify  Pain severity:  Mild  Timing:  Constant  Progression:  Unchanged  Chronicity:  New  Worsened by:  Nothing  Ineffective treatments:  Flushing with water (Removal at [facility])  Associated symptoms: no abdominal pain, no choking, no congestion, no cough, no cyanosis, no difficulty breathing, no drooling, no ear pain, no hearing loss, no nasal discharge, no nausea, no nosebleeds, no rectal bleeding, no rectal pain, no rhinorrhea, no sore throat, no trouble swallowing, no vaginal discharge, no vaginal pain, no voice change and no vomiting        Prior to Admission Medications   Prescriptions Last Dose Informant Patient Reported? Taking?    ALPRAZolam (XANAX) 0 25 mg tablet  Self Yes No   Sig: take 1-2 tablets by mouth daily if needed   OXcarbazepine (TRILEPTAL) 150 mg tablet  Self Yes No   clonazePAM (KlonoPIN) 0 5 mg tablet  Self Yes No   escitalopram (LEXAPRO) 10 mg tablet  Self Yes No   Sig: Take 10 mg by mouth every morning   omeprazole (PriLOSEC) 20 mg delayed release capsule  Self Yes No   Sig: Take 20 mg by mouth daily      Facility-Administered Medications: None       Past Medical History:   Diagnosis Date    Basal cell adenocarcinoma     B[facility] disorder [facility] [address] [facility]     Depression     last assessed 06/10/2014       Past Surgical History:   Procedure Laterality Date     SECTION      COLONOSCOPY      HIATAL HERNIA REPAIR  2017    KNEE SURGERY      left , right    MOHS SURGERY         Family History   Problem Relation Age of Onset    Hypertension Mother     Osteoporosis Mother     Dementia Mother     Heart disease Father         cardiac disorder , colon cancer, diabetes    Colon cancer Father 61    Thyroid disease Sister     Cancer Brother         bladder cancer    Thyroid disease Daughter     No Known Problems Sister     No Known Problems Sister     No Known Problems Sister     No Known Problems Sister     No Known Problems Sister     No Known Problems Sister     No Known Problems Daughter      I have reviewed and agree with the history as documented  E-Cigarette/Vaping    E-Cigarette Use Never User      E-Cigarette/Vaping Substances     Social History     Tobacco Use    Smoking status: Never Smoker    Smokeless tobacco: Never Used   Substance Use Topics    Alcohol use: Not Currently     Comment: occ    Drug use: No       Review of Systems   HENT: Negative for congestion, drooling, ear pain, hearing loss, nosebleeds, rhinorrhea, sore throat, trouble swallowing and voice change  Eyes:        Eye irritation, suspected foreign body   Respiratory: Negative for cough and choking  Cardiovascular: Negative for cyanosis  Gastrointestinal: Negative for abdominal pain, nausea, rectal pain and vomiting  Genitourinary: Negative for vaginal discharge and vaginal pain  Physical Exam  Physical Exam  Vitals signs and nursing note reviewed     Constitutional:       Appearance: She is well-developed  HENT:      Head: Normocephalic and atraumatic  Eyes:      General: Lids are normal  Vision grossly intact  Left eye: No foreign body, discharge or hordeolum  Extraocular Movements: Extraocular movements intact  Conjunctiva/sclera:      Left eye: Hemorrhage (laterally) present  Pupils: Pupils are equal, round, and reactive to light  Left eye: No corneal abrasion or fluorescein uptake  Slit lamp exam:     Left eye: No hyphema  Comments: Left eye without signs of retained contact lens  Words were swept with a Q-tip applicator after tetracaine was applied to the eye and the applicator  Additionally eye was fluorescein stained without signs of FB or abrasion  Cardiovascular:      Rate and Rhythm: Normal rate  Pulmonary:      Effort: Pulmonary effort is normal  No respiratory distress  Skin:     General: Skin is warm and dry  Neurological:      Mental Status: She is alert and oriented to person, place, and time     Psychiatric:         Mood and Affect: Mood normal          Behavior: Behavior normal          Vital Signs  ED Triage Vitals [03/27/21 1228]   Temperature Pulse Respirations Blood Pressure SpO2   98 4 °F (36 9 °C) 75 16 (!) 180/90 98 %      Temp Source Heart Rate Source Patient Position - Orthostatic VS BP Location FiO2 (%)   Oral -- -- -- --      Pain Score       --           Vitals:    03/27/21 1228   BP: (!) 180/90   Pulse: 75         Visual Acuity      ED Medications  Medications   tobramycin (TOBREX) 0 3 % ophthalmic solution 2 drop (has no administration in time range)   tetracaine 0 5 % ophthalmic solution 1 drop (1 drop Right Eye Given 3/27/21 1256)   fluorescein sodium sterile ophthalmic strip 1 strip (1 strip Right Eye Given 3/27/21 1257)       Diagnostic Studies  Results Reviewed     None                 No orders to display              Procedures  Procedures         ED Course                                           MDM  Number of Diagnoses or Management Options  Irritation of left eye:   Subconjunctival hemorrhage of left eye:   Diagnosis management comments: Differential diagnosis includes but not limited to:  Ocular foreign body, subconjunctival hemorrhage, irritation of the eye  Amount and/or Complexity of Data Reviewed  Discuss the patient with other providers: yes        Disposition  Final diagnoses:   Irritation of left eye   Subconjunctival hemorrhage of left eye     Time reflects when diagnosis was documented in both MDM as applicable and the Disposition within this note     Time User Action Codes Description Comment    3/27/2021  1:53 PM Eduardo Ortiz Add [H57 89] Irritation of left eye     3/27/2021  1:54 PM Eduardo Ortiz Add [H11 32] Subconjunctival hemorrhage of left eye       ED Disposition     ED Disposition Condition Date/Time Comment    Discharge Stable Sat Mar 27, 2021  1:47 PM Ana De León discharge to home/self care  Follow-up Information     Follow up With Specialties Details Why Contact Gurinder Duke MD Ophthalmology Schedule an appointment as soon as possible for a visit   Chucho Rodriguez [address] [place] [address]  [phone]            Patient's Medications   Discharge Prescriptions    No medications on file     No discharge procedures on file      PDMP Review     None          ED Provider  Electronically Signed by           Juan F Gutierrez PA-C  03/27/21 7801

## 2021-03-27 NOTE — DISCHARGE INSTRUCTIONS
Continue with tobramycin drops:  2 drops 3 times daily for 7 days  You may use artificial tears:  1-2 drops every hour as needed for irritation

## 2021-06-07 ENCOUNTER — APPOINTMENT (OUTPATIENT)
Dept: LAB | Facility: CLINIC | Age: 61
End: 2021-06-07
Payer: COMMERCIAL

## 2021-06-07 ENCOUNTER — TRANSCRIBE ORDERS (OUTPATIENT)
Dept: LAB | Facility: CLINIC | Age: 61
End: 2021-06-07

## 2021-06-07 DIAGNOSIS — R41.0 CONFUSION: ICD-10-CM

## 2021-06-07 DIAGNOSIS — R41.0 CONFUSION: Primary | ICD-10-CM

## 2021-06-07 LAB
ALBUMIN SERPL BCP-MCNC: 3.8 G/DL (ref 3.5–5)
ALP SERPL-CCNC: 90 U/L (ref 46–116)
ALT SERPL W P-5'-P-CCNC: 32 U/L (ref 12–78)
ANION GAP SERPL CALCULATED.3IONS-SCNC: 9 MMOL/L (ref 4–13)
AST SERPL W P-5'-P-CCNC: 22 U/L (ref 5–45)
BASOPHILS # BLD AUTO: 0.03 THOUSANDS/ΜL (ref 0–0.1)
BASOPHILS NFR BLD AUTO: 0 % (ref 0–1)
BILIRUB SERPL-MCNC: 0.32 MG/DL (ref 0.2–1)
BUN SERPL-MCNC: 8 MG/DL (ref 5–25)
CALCIUM SERPL-MCNC: 9.3 MG/DL (ref 8.3–10.1)
CHLORIDE SERPL-SCNC: 105 MMOL/L (ref 100–108)
CO2 SERPL-SCNC: 27 MMOL/L (ref 21–32)
CREAT SERPL-MCNC: 0.82 MG/DL (ref 0.6–1.3)
EOSINOPHIL # BLD AUTO: 0.13 THOUSAND/ΜL (ref 0–0.61)
EOSINOPHIL NFR BLD AUTO: 2 % (ref 0–6)
ERYTHROCYTE [DISTWIDTH] IN BLOOD BY AUTOMATED COUNT: 12 % (ref 11.6–15.1)
EST. AVERAGE GLUCOSE BLD GHB EST-MCNC: 120 MG/DL
FOLATE SERPL-MCNC: >20 NG/ML (ref 3.1–17.5)
GFR SERPL CREATININE-BSD FRML MDRD: 79 ML/MIN/1.73SQ M
GLUCOSE SERPL-MCNC: 107 MG/DL (ref 65–140)
HBA1C MFR BLD: 5.8 %
HCT VFR BLD AUTO: 43.1 % (ref 34.8–46.1)
HGB BLD-MCNC: 14.2 G/DL (ref 11.5–15.4)
IMM GRANULOCYTES # BLD AUTO: 0.02 THOUSAND/UL (ref 0–0.2)
IMM GRANULOCYTES NFR BLD AUTO: 0 % (ref 0–2)
LYMPHOCYTES # BLD AUTO: 2.25 THOUSANDS/ΜL (ref 0.6–4.47)
LYMPHOCYTES NFR BLD AUTO: 30 % (ref 14–44)
MCH RBC QN AUTO: 30.9 PG (ref 26.8–34.3)
MCHC RBC AUTO-ENTMCNC: 32.9 G/DL (ref 31.4–37.4)
MCV RBC AUTO: 94 FL (ref 82–98)
MONOCYTES # BLD AUTO: 0.56 THOUSAND/ΜL (ref 0.17–1.22)
MONOCYTES NFR BLD AUTO: 8 % (ref 4–12)
NEUTROPHILS # BLD AUTO: 4.43 THOUSANDS/ΜL (ref 1.85–7.62)
NEUTS SEG NFR BLD AUTO: 60 % (ref 43–75)
NRBC BLD AUTO-RTO: 0 /100 WBCS
PLATELET # BLD AUTO: 318 THOUSANDS/UL (ref 149–390)
PMV BLD AUTO: 9.4 FL (ref 8.9–12.7)
POTASSIUM SERPL-SCNC: 3.4 MMOL/L (ref 3.5–5.3)
PROT SERPL-MCNC: 7.3 G/DL (ref 6.4–8.2)
RBC # BLD AUTO: 4.6 MILLION/UL (ref 3.81–5.12)
SODIUM SERPL-SCNC: 141 MMOL/L (ref 136–145)
VIT B12 SERPL-MCNC: 767 PG/ML (ref 100–900)
WBC # BLD AUTO: 7.42 THOUSAND/UL (ref 4.31–10.16)

## 2021-06-07 PROCEDURE — 83036 HEMOGLOBIN GLYCOSYLATED A1C: CPT

## 2021-06-07 PROCEDURE — 80053 COMPREHEN METABOLIC PANEL: CPT

## 2021-06-07 PROCEDURE — 82746 ASSAY OF FOLIC ACID SERUM: CPT

## 2021-06-07 PROCEDURE — 85025 COMPLETE CBC W/AUTO DIFF WBC: CPT

## 2021-06-07 PROCEDURE — 36415 COLL VENOUS BLD VENIPUNCTURE: CPT

## 2021-06-07 PROCEDURE — 82607 VITAMIN B-12: CPT

## 2021-06-08 ENCOUNTER — TELEPHONE (OUTPATIENT)
Dept: GASTROENTEROLOGY | Facility: CLINIC | Age: 61
End: 2021-06-08

## 2021-06-08 ENCOUNTER — PREP FOR PROCEDURE (OUTPATIENT)
Dept: GASTROENTEROLOGY | Facility: CLINIC | Age: 61
End: 2021-06-08

## 2021-06-08 DIAGNOSIS — K22.70 BARRETT'S ESOPHAGUS WITHOUT DYSPLASIA: Primary | ICD-10-CM

## 2021-06-08 DIAGNOSIS — Z86.010 HISTORY OF COLON POLYPS: ICD-10-CM

## 2021-06-08 DIAGNOSIS — K44.9 HIATAL HERNIA: ICD-10-CM

## 2021-06-08 DIAGNOSIS — Z80.0 FAMILY HISTORY OF COLON CANCER: ICD-10-CM

## 2021-06-08 NOTE — TELEPHONE ENCOUNTER
Returned Pt call and LMOM for her to call back to schedule with Dr Radha Rivera  Please schedule OV appt  If she calls

## 2021-06-08 NOTE — TELEPHONE ENCOUNTER
Pt lmom asking for a call back to schedule an overdue appt and colon that she is also overdue for with Dr Joanie Chris  I returned pt's call, lmom apologizing for missing her and asked her to please call back  I was planning on scheduling pt for appt first as it does not state when she is due for her recall EGD  If pt calls back, please schedule her

## 2021-06-08 NOTE — TELEPHONE ENCOUNTER
Pt overdue for Colonoscopy  No recall for EGD, she has [facility] and last EGD October 2019  She also had TIF in 2017  Can she schedule an EGD with her Colon?    Please advise

## 2021-06-24 ENCOUNTER — ANESTHESIA EVENT (OUTPATIENT)
Dept: GASTROENTEROLOGY | Facility: AMBULATORY SURGERY CENTER | Age: 61
End: 2021-06-24

## 2021-06-24 ENCOUNTER — ANESTHESIA (OUTPATIENT)
Dept: GASTROENTEROLOGY | Facility: AMBULATORY SURGERY CENTER | Age: 61
End: 2021-06-24

## 2021-06-24 ENCOUNTER — HOSPITAL ENCOUNTER (OUTPATIENT)
Dept: GASTROENTEROLOGY | Facility: AMBULATORY SURGERY CENTER | Age: 61
Discharge: HOME | End: 2021-06-24
Payer: COMMERCIAL

## 2021-06-24 VITALS
HEART RATE: 73 BPM | HEIGHT: 62 IN | SYSTOLIC BLOOD PRESSURE: 128 MMHG | WEIGHT: 175 LBS | OXYGEN SATURATION: 99 % | DIASTOLIC BLOOD PRESSURE: 82 MMHG | BODY MASS INDEX: 32.2 KG/M2 | RESPIRATION RATE: 18 BRPM | TEMPERATURE: 95.7 F

## 2021-06-24 DIAGNOSIS — K44.9 HIATAL HERNIA: ICD-10-CM

## 2021-06-24 DIAGNOSIS — Z86.010 HISTORY OF COLON POLYPS: ICD-10-CM

## 2021-06-24 DIAGNOSIS — K22.70 BARRETT'S ESOPHAGUS WITHOUT DYSPLASIA: ICD-10-CM

## 2021-06-24 DIAGNOSIS — Z80.0 FAMILY HISTORY OF COLON CANCER: ICD-10-CM

## 2021-06-24 PROCEDURE — 45385 COLONOSCOPY W/LESION REMOVAL: CPT | Performed by: INTERNAL MEDICINE

## 2021-06-24 PROCEDURE — 45380 COLONOSCOPY AND BIOPSY: CPT | Performed by: INTERNAL MEDICINE

## 2021-06-24 PROCEDURE — 00813 ANES UPR LWR GI NDSC PX: CPT | Performed by: NURSE ANESTHETIST, CERTIFIED REGISTERED

## 2021-06-24 PROCEDURE — 88305 TISSUE EXAM BY PATHOLOGIST: CPT | Performed by: PATHOLOGY

## 2021-06-24 PROCEDURE — 43239 EGD BIOPSY SINGLE/MULTIPLE: CPT | Performed by: INTERNAL MEDICINE

## 2021-06-24 RX ORDER — GLYCOPYRROLATE 0.2 MG/ML
INJECTION INTRAMUSCULAR; INTRAVENOUS AS NEEDED
Status: DISCONTINUED | OUTPATIENT
Start: 2021-06-24 | End: 2021-06-24

## 2021-06-24 RX ORDER — SODIUM CHLORIDE 9 MG/ML
20 INJECTION, SOLUTION INTRAVENOUS CONTINUOUS
Status: DISCONTINUED | OUTPATIENT
Start: 2021-06-24 | End: 2021-06-28 | Stop reason: HOSPADM

## 2021-06-24 RX ORDER — SODIUM CHLORIDE 9 MG/ML
INJECTION, SOLUTION INTRAVENOUS CONTINUOUS PRN
Status: DISCONTINUED | OUTPATIENT
Start: 2021-06-24 | End: 2021-06-24

## 2021-06-24 RX ORDER — PROPOFOL 10 MG/ML
INJECTION, EMULSION INTRAVENOUS AS NEEDED
Status: DISCONTINUED | OUTPATIENT
Start: 2021-06-24 | End: 2021-06-24

## 2021-06-24 RX ORDER — FAMOTIDINE 40 MG/1
40 TABLET, FILM COATED ORAL
Qty: 30 TABLET | Refills: 1 | Status: SHIPPED | OUTPATIENT
Start: 2021-06-24 | End: 2021-08-19 | Stop reason: SDUPTHER

## 2021-06-24 RX ORDER — SODIUM CHLORIDE 9 MG/ML
30 INJECTION, SOLUTION INTRAVENOUS CONTINUOUS
Status: DISCONTINUED | OUTPATIENT
Start: 2021-06-24 | End: 2021-06-28 | Stop reason: HOSPADM

## 2021-06-24 RX ORDER — CLONAZEPAM 1 MG/1
1 TABLET ORAL
COMMUNITY
Start: 2021-06-16

## 2021-06-24 RX ADMIN — PROPOFOL 50 MG: 10 INJECTION, EMULSION INTRAVENOUS at 10:29

## 2021-06-24 RX ADMIN — SODIUM CHLORIDE: 9 INJECTION, SOLUTION INTRAVENOUS at 10:24

## 2021-06-24 RX ADMIN — PROPOFOL 50 MG: 10 INJECTION, EMULSION INTRAVENOUS at 10:32

## 2021-06-24 RX ADMIN — GLYCOPYRROLATE 0.1 MG: 0.2 INJECTION INTRAMUSCULAR; INTRAVENOUS at 10:26

## 2021-06-24 RX ADMIN — PROPOFOL 200 MG: 10 INJECTION, EMULSION INTRAVENOUS at 10:26

## 2021-06-24 RX ADMIN — PROPOFOL 50 MG: 10 INJECTION, EMULSION INTRAVENOUS at 10:35

## 2021-06-24 RX ADMIN — PROPOFOL 50 MG: 10 INJECTION, EMULSION INTRAVENOUS at 10:39

## 2021-06-24 NOTE — ANESTHESIA POSTPROCEDURE EVALUATION
Post-Op Assessment Note    CV Status:  Stable    Pain management: adequate     Mental Status:  Alert and awake   Hydration Status:  Euvolemic   PONV Controlled:  Controlled   Airway Patency:  Patent      Post Op Vitals Reviewed: Yes      Staff: CRNA   Comments: vss        No complications documented      /80 (06/24/21 1045)    Temp     Pulse 75 (06/24/21 1045)   Resp 18 (06/24/21 1045)    SpO2 95 % (06/24/21 1045)

## 2021-06-24 NOTE — DISCHARGE INSTRUCTIONS
Upper Endoscopy and Colonoscopy   WHAT YOU NEED TO KNOW:   An upper endoscopy is also called an upper gastrointestinal (GI) endoscopy, or an esophagogastroduodenoscopy (EGD)  It is a procedure to examine the inside of your esophagus, stomach, and duodenum (first part of the small intestine) with a scope  You may feel bloated, gassy, or have some abdominal discomfort after your procedure  Your throat may be sore for 24 to 36 hours  You may burp or pass gas from air that is still inside your body  A colonoscopy is a procedure to examine the inside of your colon (intestine) with a scope  Polyps or tissue growths may have been removed during your colonoscopy  It is normal to feel bloated and to have some abdominal discomfort  You should be passing gas  If you have hemorrhoids or you had polyps removed, you may have a small amount of bleeding  DISCHARGE INSTRUCTIONS:   Seek care immediately if:   · You have sudden, severe abdominal pain  · You have problems swallowing  · You have a large amount of black, sticky bowel movements or blood in your bowel movements  · You have sudden trouble breathing  · You feel weak, lightheaded, or faint or your heart beats faster than normal for you  Contact your healthcare provider if:   · You have a fever and chills  · You have nausea or are vomiting  · Your abdomen is bloated or feels full and hard  · You have abdominal pain  · You have a large amount of black, sticky bowel movements or blood in your bowel movements  · You have not had a bowel movement for 3 days after your procedure  · You have rash or hives  · You have questions or concerns about your procedure  Activity:   ·       Do not lift, strain, or run for 24 hours after your procedure  ·       Rest after your procedure  You have been given medicine to relax you  Do not drive or make important decisions until the day after your procedure   Return to your normal activity as directed  ·       Relieve gas and discomfort from bloating by lying on your right side with a heating pad on your abdomen  You may need to take short walks to help the gas move out  Eat small meals until bloating is relieved  Follow up with your healthcare provider as directed: Write down your questions so you remember to ask them during your visits  If you take a blood thinner, please review the specific instructions from your endoscopist about when you should resume it  These can be found in the Recommendation and Your Medication list sections of this After Visit Summary  High Fiber Diet   WHAT YOU NEED TO KNOW:   What is a high-fiber diet? A high-fiber diet includes foods that have a high amount of fiber  Fiber is the part of fruits, vegetables, and grains that is not broken down by your body  Fiber keeps your bowel movements regular  Fiber can also help lower your cholesterol level, control blood sugar in people with diabetes, and relieve constipation  Fiber can also help you control your weight because it helps you feel full faster  Most adults should eat 25 to 35 grams of fiber each day  Talk to your dietitian or healthcare provider about the amount of fiber you need  What foods are good sources of fiber? · Foods with at least 4 grams of fiber per serving:      ? ? to ½ cup of high-fiber cereal (check the nutrition label on the box)    ? ½ cup of blackberries or raspberries    ? 4 dried prunes    ? 1 cooked artichoke    ? ½ cup of cooked legumes, such as lentils, or red, kidney, and michel beans    · Foods with 1 to 3 grams of fiber per serving:      ? 1 slice of whole-wheat, pumpernickel, or rye bread    ? ½ cup of cooked brown rice    ? 4 whole-wheat crackers    ? 1 cup of oatmeal    ? ½ cup of cereal with 1 to 3 grams of fiber per serving (check the nutrition label on the box)    ? 1 small piece of fruit, such as an apple, banana, pear, kiwi, or orange    ?  3 dates    ? ½ cup of canned apricots, fruit cocktail, peaches, or pears    ? ½ cup of raw or cooked vegetables, such as carrots, cauliflower, cabbage, spinach, squash, or corn  What are some ways that I can increase fiber in my diet? · Choose brown or wild rice instead of white rice  · Use whole wheat flour in recipes instead of white or all-purpose flour  · Add beans and peas to casseroles or soups  · Choose fresh fruit and vegetables with peels or skins on instead of juices  What other guidelines should I follow? · Add fiber to your diet slowly  You may have abdominal discomfort, bloating, and gas if you add fiber to your diet too quickly  · Drink plenty of liquids as you add fiber to your diet  You may have nausea or develop constipation if you do not drink enough water  Ask how much liquid to drink each day and which liquids are best for you  CARE AGREEMENT:   You have the right to help plan your care  Discuss treatment options with your healthcare provider to decide what care you want to receive  You always have the right to refuse treatment  The above information is an  only  It is not intended as medical advice for individual conditions or treatments  Talk to your doctor, nurse or pharmacist before following any medical regimen to see if it is safe and effective for you  © Copyright [address] Information is for End User's use only and may not be sold, redistributed or otherwise used for commercial purposes  All illustrations and images included in CareNotes® are the copyrighted property of [employer]  or [facility] Geno Wong   Hemorrhoids   WHAT YOU NEED TO KNOW:   What are hemorrhoids? Hemorrhoids are swollen blood vessels inside your rectum (internal hemorrhoids) or on your anus (external hemorrhoids)  Sometimes a hemorrhoid may prolapse  This means it extends out of your anus  What increases my risk for hemorrhoids?    · Pregnancy or obesity    · Straining or sitting for a long time during bowel movements    · Liver disease    · Weak muscles around the anus caused by older age, rectal surgery, or anal intercourse    · A lack of physical activity    · Chronic diarrhea or constipation    · A low-fiber diet    What are the signs and symptoms of hemorrhoids? · Pain or itching around your anus or inside your rectum    · Swelling or bumps around your anus    · Bright red blood in your bowel movement, on the toilet paper, or in the toilet bowl    · Tissue bulging out of your anus (prolapsed hemorrhoids)    · Incontinence (poor control over urine or bowel movements)    How are hemorrhoids diagnosed? Your healthcare provider will ask about your symptoms, the foods you eat, and your bowel movements  He or she will examine your anus for external hemorrhoids  You may need the following:  · A digital rectal exam  is a test to check for hemorrhoids  Your healthcare provider will put a gloved finger inside your anus to feel for the hemorrhoids  · An anoscopy  is a test that uses a scope (small tube with a light and camera on the end) to look at your hemorrhoids  How are hemorrhoids treated? Treatment will depend on your symptoms  You may need any of the following:  · Medicines  can help decrease pain and swelling, and soften your bowel movement  The medicine may be a pill, pad, cream, or ointment  · Procedures  may be used to shrink or remove your hemorrhoid  Examples include rubber-band ligation, sclerotherapy, and photocoagulation  These procedures may be done in your healthcare provider's office  Ask your healthcare provider for more information about these procedures  · Surgery  may be needed to shrink or remove your hemorrhoids  How can I manage my symptoms? · Apply ice on your anus for 15 to 20 minutes every hour or as directed  Use an ice pack, or put crushed ice in a plastic bag   Cover it with a towel before you apply it to your anus  Ice helps prevent tissue damage and decreases swelling and pain  · Take a sitz bath  Fill a bathtub with 4 to 6 inches of warm water  You may also use a sitz bath pan that fits inside a toilet bowl  Sit in the sitz bath for 15 minutes  Do this 3 times a day, and after each bowel movement  The warm water can help decrease pain and swelling  · Keep your anal area clean  Gently wash the area with warm water daily  Soap may irritate the area  After a bowel movement, wipe with moist towelettes or wet toilet paper  Dry toilet paper can irritate the area  How can I help prevent hemorrhoids? · Do not strain to have a bowel movement  Do not sit on the toilet too long  These actions can increase pressure on the tissues in your rectum and anus  · Drink plenty of liquids  Liquids can help prevent constipation  Ask how much liquid to drink each day and which liquids are best for you  · Eat a variety of high-fiber foods  Examples include fruits, vegetables, and whole grains  Ask your healthcare provider how much fiber you need each day  You may need to take a fiber supplement  · Exercise as directed  Exercise, such as walking, may make it easier to have a bowel movement  Ask your healthcare provider to help you create an exercise plan  · Do not have anal sex  Anal sex can weaken the skin around your rectum and anus  · Avoid heavy lifting  This can cause straining and increase your risk for another hemorrhoid  When should I seek immediate care? · You have severe pain in your rectum or around your anus  · You have severe pain in your abdomen and you are vomiting  · You have bleeding from your anus that soaks through your underwear  When should I contact my healthcare provider? · You have frequent and painful bowel movements  · Your hemorrhoid looks or feels more swollen than usual      · You do not have a bowel movement for 2 days or more       · You see or feel tissue coming through your anus  · You have questions or concerns about your condition or care  CARE AGREEMENT:   You have the right to help plan your care  Learn about your health condition and how it may be treated  Discuss treatment options with your healthcare providers to decide what care you want to receive  You always have the right to refuse treatment  The above information is an  only  It is not intended as medical advice for individual conditions or treatments  Talk to your doctor, nurse or pharmacist before following any medical regimen to see if it is safe and effective for you  © Copyright [address] Information is for End User's use only and may not be sold, redistributed or otherwise used for commercial purposes  All illustrations and images included in CareNotes® are the copyrighted property of [employer]  or [facility] Geno Wong   Diverticulosis   WHAT YOU NEED TO KNOW:   What is diverticulosis? Diverticulosis is a condition that causes small pockets called diverticula to form in your intestine  These pockets make it difficult for bowel movements to pass through your digestive system  What causes diverticulosis? Diverticula form when muscles have to work hard to move bowel movements through the intestine  The force causes bulges to form at weak areas in the intestine  This may happen if you eat foods that are low in fiber  Fiber helps give your bowel movements more bulk so they are larger and easier to move through your colon  The following may increase your risk of diverticulosis:  · A history of constipation    · Age 36 or older    · Obesity    · Lack of exercise    What are the signs and symptoms of diverticulosis? Diverticulosis usually does not cause any signs or symptoms  It may cause any of the following in some people:  · Pain or discomfort in your lower abdomen    · Abdominal bloating    · Constipation or diarrhea    How is diverticulosis diagnosed?   Your healthcare provider will examine you and ask about your bowel movements, diet, and symptoms  He or she will also ask about any medical conditions you have or medicines you take  You may need any of the following:  · Blood tests  may be done to check for signs of inflammation  · A barium enema  is an x-ray of your colon that may show diverticula  A tube is put into your anus, and a liquid called barium is put through the tube  Barium is used so that healthcare providers can see your colon more clearly  · Flexible sigmoidoscopy  is a test to look for any changes in your lower intestines and rectum  It may also show the cause of any bleeding or pain  A soft, bendable tube with a light on the end will be put into your anus  It will then be moved forward into your intestine  · A colonoscopy  is used to look at your whole colon  A scope (long bendable tube with a light on the end) is used to take pictures  This test may show diverticula  · A CT scan , or CAT scan, may show diverticula  You may be given contrast liquid before the scan  Tell the healthcare provider if you have ever had an allergic reaction to contrast liquid  How is diverticulosis managed? The goal of treatment is to manage any symptoms you have and prevent other problems such as diverticulitis  Diverticulitis is swelling or infection of the diverticula  Your healthcare provider may recommend any of the following:  · Eat a variety of high-fiber foods  High-fiber foods help you have regular bowel movements  High-fiber foods include cooked beans, fruits, vegetables, and some cereals  Most adults need 25 to 35 grams of fiber each day  Your healthcare provider may recommend that you have more  Ask your healthcare provider how much fiber you need  Increase fiber slowly  You may have abdominal discomfort, bloating, and gas if you add fiber to your diet too quickly   You may need to take a fiber supplement if you are not getting enough fiber from food  · Medicines  to soften your bowel movements may be given  You may also need medicines to treat symptoms such as bloating and pain  · Drink liquids as directed  You may need to drink 2 to 3 liters (8 to 12 cups) of liquids every day  Ask your healthcare provider how much liquid to drink each day and which liquids are best for you  · Apply heat  on your abdomen for 20 to 30 minutes every 2 hours for as many days as directed  Heat helps decrease pain and muscle spasms  How can I help prevent diverticulitis or other symptoms? The following may help decrease your risk for diverticulitis or symptoms, such as bleeding  Talk to your provider about these or other things you can do to prevent problems that may occur with diverticulosis  · Exercise regularly  Ask your healthcare provider about the best exercise plan for you  Exercise can help you have regular bowel movements  Get 30 minutes of exercise on most days of the week  · Maintain a healthy weight  Ask your healthcare provider how much you should weigh  Ask him or her to help you create a weight loss plan if you are overweight  · Do not smoke  Nicotine and other chemicals in cigarettes increase your risk for diverticulitis  Ask your healthcare provider for information if you currently smoke and need help to quit  E-cigarettes or smokeless tobacco still contain nicotine  Talk to your healthcare provider before you use these products  · Ask your healthcare provider if it is safe to take NSAIDs  NSAIDs may increase your risk of diverticulitis  When should I seek immediate care? · You have severe pain on the left side of your lower abdomen  · Your bowel movements are bright or dark red  When should I contact my healthcare provider? · You have a fever and chills  · You feel dizzy or lightheaded  · You have nausea, or you are vomiting  · You have a change in your bowel movements      · You have questions or concerns about your condition or care  CARE AGREEMENT:   You have the right to help plan your care  Learn about your health condition and how it may be treated  Discuss treatment options with your healthcare providers to decide what care you want to receive  You always have the right to refuse treatment  The above information is an  only  It is not intended as medical advice for individual conditions or treatments  Talk to your doctor, nurse or pharmacist before following any medical regimen to see if it is safe and effective for you  © Copyright [address] Information is for End User's use only and may not be sold, redistributed or otherwise used for commercial purposes  All illustrations and images included in CareNotes® are the copyrighted property of [employer] Inc  or [address] [place] [address] Marvin   Colorectal Polyps   WHAT YOU NEED TO KNOW:   What are colorectal polyps? Colorectal polyps are small growths of tissue in the lining of the colon and rectum  Most polyps are hyperplastic polyps and are usually benign (noncancerous)  Certain types of polyps, called adenomatous polyps, may turn into cancer  What increases my risk of colorectal polyps? The exact cause of colorectal polyps is unknown  The following may increase your risk:  · Older age    · A diet of foods high in fat and low in fiber     · Family history of polyps    · Intestinal diseases, such as Crohn's disease or ulcerative colitis    · An unhealthy lifestyle, such as physical inactivity, smoking, or drinking alcohol    · Obesity    What are the signs and symptoms of colorectal polyps? · Blood in your bowel movement or bleeding from the rectum    · Change in bowel movement habits, such as diarrhea and constipation    · Abdominal pain    How are colorectal polyps diagnosed? You should have fecal blood screening once a year for colorectal disease if you are over 48years old   You should be screened earlier if you have an intestinal disease or a family history of polyps or colorectal cancer  During this screening, a sample of your bowel movement is checked for blood, which may be an early sign of colorectal polyps or cancer  You may also need any of the following tests:  · Digital rectal exam:  Your healthcare provider will examine your anus and use a finger to check your rectum for polyps  · Barium enema: A barium enema is an x-ray of the colon  A tube is put into your anus, and a liquid called barium is put through the tube  Barium is used so that healthcare providers can see your colon better on the x-ray film  · Virtual colonoscopy: This is a CT scan that takes pictures of the inside of your colon and rectum  A small, flexible tube is put into your rectum and air or carbon dioxide (gas) is used to expand your colon  This lets healthcare providers clearly see your colon and any polyps on a monitor  · Colonoscopy or sigmoidoscopy: These procedures help your healthcare provider see the inside of your colon using a flexible tube with a small light and camera on the end  During a sigmoidoscopy, your healthcare provider will only look at rectum and lower colon  During a colonoscopy, healthcare providers will look at the full length of your colon  Healthcare providers may remove a small amount of tissue from the colon for a biopsy  How are colorectal polyps treated? A polypectomy is a minimally invasive procedure to remove your polyps  They may be removed during a colonoscopy or sigmoidoscopy  Your healthcare provider may need to remove the polyps with a laparoscope  Laparoscopy is done by inserting a small, flexible scope into incisions made on your abdomen  What are the risks of colorectal polyps? You may bleed during a colonoscopy procedure  Your bowel may be perforated (torn) when polyps are removed  This may lead to an open abdominal surgery  During surgery, you may bleed too much or get an infection  Adenomatous polyps that are not removed may turn into cancer and become more difficult to treat  Where can I find support and more information? · [address] ([facility])  [address] , [address]  [facility] [address]  Phone: [phone]  Web Address: Jo Ann Harrison  [facility] gov    When should I contact my healthcare provider? · You have a fever  · You have chills, a cough, or feel weak and achy  · You have abdominal pain that does not go away or gets worse after you take medicine  · Your abdomen is swollen  · You are losing weight without trying  · You have questions or concerns about your condition or care  When should I seek immediate care or call 911? · You have sudden shortness of breath  · You have a fast heart rate, fast breathing, or are too dizzy to stand up  · You have severe abdominal pain  · You see blood in your bowel movement  CARE AGREEMENT:   You have the right to help plan your care  Learn about your health condition and how it may be treated  Discuss treatment options with your healthcare providers to decide what care you want to receive  You always have the right to refuse treatment  The above information is an  only  It is not intended as medical advice for individual conditions or treatments  Talk to your doctor, nurse or pharmacist before following any medical regimen to see if it is safe and effective for you  © Copyright [address] Information is for End User's use only and may not be sold, redistributed or otherwise used for commercial purposes   All illustrations and images included in CareNotes® are the copyrighted property of [employer] M [employer] Inc  or [facility] [facility]     Famotidine (Acid Controller, Acid Reducer, Pepcid AC, Pepcid) - (By mouth)   Why this medicine is used:   Treats ulcers, gastroesophageal reflux disease (GERD), and conditions that cause the stomach to produce too much acid  Also treats heartburn caused by acid indigestion  Contact a nurse or doctor right away if you have:  · Blistering, peeling, red skin rash  · Fast, pounding, or uneven heartbeat, agitation, confusion  · Fainting, dizziness, lightheadedness, seizures  · Dark urine or pale stools, yellow eyes or skin  · Unusual bleeding, bruising, weakness  · Fever, chills, cough, sore throat, body aches     Common side effects:  · Constipation, diarrhea, upset stomach  · Headache, nausea, vomiting  © Copyright [employer] 2021 Information is for End User's use only and may not be sold, redistributed or otherwise used for commercial purposes  Gastritis   WHAT YOU NEED TO KNOW:   Gastritis is inflammation or irritation of the lining of your stomach  DISCHARGE INSTRUCTIONS:   Call 911 for any of the following:   · You develop chest pain or shortness of breath  Seek care immediately if:   · You vomit blood  · You have black or bloody bowel movements  · You have severe stomach or back pain  Contact your healthcare provider if:   · You have a fever  · You have new or worsening symptoms, even after treatment  · You have questions or concerns about your condition or care  Medicines:   · Medicines  may be given to help treat a bacterial infection or decrease stomach acid  · Take your medicine as directed  Contact your healthcare provider if you think your medicine is not helping or if you have side effects  Tell him or her if you are allergic to any medicine  Keep a list of the medicines, vitamins, and herbs you take  Include the amounts, and when and why you take them  Bring the list or the pill bottles to follow-up visits  Carry your medicine list with you in case of an emergency  Manage or prevent gastritis:   · Do not smoke  Nicotine and other chemicals in cigarettes and cigars can make your symptoms worse and cause lung damage   Ask your healthcare provider for information if you currently smoke and need help to quit  E-cigarettes or smokeless tobacco still contain nicotine  Talk to your healthcare provider before you use these products  · Do not drink alcohol  Alcohol can prevent healing and make your gastritis worse  Talk to your healthcare provider if you need help to stop drinking  · Do not take NSAIDs or aspirin unless directed  These and similar medicines can cause irritation  If your healthcare provider says it is okay to take NSAIDs, take them with food  · Do not eat foods that cause irritation  Foods such as oranges and salsa can cause burning or pain  Eat a variety of healthy foods  Examples include fruits (not citrus), vegetables, low-fat dairy products, beans, whole-grain breads, and lean meats and fish  Try to eat small meals, and drink water with your meals  Do not eat for at least 3 hours before you go to bed  · Find ways to relax and decrease stress  Stress can increase stomach acid and make gastritis worse  Activities such as yoga, meditation, or listening to music can help you relax  Spend time with friends, or do things you enjoy  Follow up with your healthcare provider as directed: You may need ongoing tests or treatment, or referral to a gastroenterologist  Write down your questions so you remember to ask them during your visits  © Copyright [address] Information is for End User's use only and may not be sold, redistributed or otherwise used for commercial purposes  All illustrations and images included in CareNotes® are the copyrighted property of [employer]  or [facility] Geno Wong   The above information is an  only  It is not intended as medical advice for individual conditions or treatments  Talk to your doctor, nurse or pharmacist before following any medical regimen to see if it is safe and effective for you  Harris Esophagus   WHAT YOU NEED TO KNOW:   What is Harris esophagus?   Harris esophagus is a condition that is also called intestinal metaplasia  Cells that line your esophagus change into cells that are like intestine cells  The change increases your risk for esophageal cancer  What increases my risk for Harris esophagus? The exact cause of Harris esophagus is not known  The following may increase your risk:  · Long-term gastroesophageal reflux disease (GERD)    · Age older than 48    · A family history of GERD, Harris esophagus, or esophageal cancer    · Obesity    · Smoking cigarettes    What are the signs and symptoms of Harris esophagus? Signs and symptoms are usually related to the signs and symptoms of GERD  You may have any of the following:  · Heartburn (burning pain in your chest)    · Pain after meals that spreads to your neck, jaw, or shoulder    · Pain that gets better when you change positions    · Bitter or acid taste in your mouth    · A dry cough    · Trouble swallowing or pain with swallowing    · Hoarseness or a sore throat    · Burping or hiccups    · Feeling full soon after you start eating    How is Harris esophagus diagnosed? An endoscopy is a procedure used to see the inside of your esophagus and stomach  A scope is a long, bendable tube with a light on the end of it  A camera may be hooked to the scope  Your healthcare provider may also take tissue samples to be tested for dysplasia (abnormal changes in your cells and tissues)  If dysplasia is found, it will be given a grade to describe the amount of change  The grade can range from negative (no dysplasia) to high-grade (a large amount)  You have a higher risk for cancer with high-grade dysplasia  How is Harris esophagus treated? Treatment depends on the grade of dysplasia  The goal of treatment is to control your symptoms and prevent esophageal cancer  Your healthcare provider may also suggest that you make changes in the foods you eat and in your lifestyle   You may need any of the following:  · Anti-reflux medicines  help decrease the stomach acid that can irritate your esophagus and stomach  These medicines may include proton pump inhibitors (PPI) and histamine type-2 receptor (H2) blockers  You may also be given medicines to stop vomiting  · Surgery or other procedures  may be done if you have high-grade dysplasia  Abnormal cells may be killed with heat or by freezing them  Light may be used to kill abnormal cells  It is used in combination with medicines that make the abnormal cells sensitive to light  Surgery may be used to wrap the upper part of your stomach around the esophageal sphincter to strengthen it  Surgery may be needed to remove part or all of your esophagus  What can I do to manage Harris esophagus? · Do not eat foods that make your symptoms worse  Examples are chocolate, garlic, onions, spicy or fatty foods, citrus fruits (oranges), and tomato-based foods (spaghetti sauce)  Do not drink alcohol, drinks that contain caffeine, or carbonated drinks, such as soda  Ask your healthcare provider if there are other foods and drinks you should not have  · Maintain a healthy weight  If you are overweight, weight loss may help relieve symptoms  Ask your healthcare provider about safe ways to lose weight  · Do not smoke  If you smoke, it is never too late to quit  Smoking may worsen acid reflux  Ask your healthcare provider for information if you need help quitting  Where can I get support and more information? · [address]  [place]    [facility]  Phone: [phone]  Web Address: http://"[employer]/  org    Call your local emergency number (911 in the [address] [facility]) if:   · You have severe chest pain and shortness of breath  When should I seek immediate care? · Your bowel movements are black, bloody, or tarry  · Your vomit looks like coffee grounds or has blood in it  When should I call my doctor? · Your symptoms do not improve with treatment      · You have questions or concerns about your condition or care  CARE AGREEMENT:   You have the right to help plan your care  Learn about your health condition and how it may be treated  Discuss treatment options with your healthcare providers to decide what care you want to receive  You always have the right to refuse treatment  The above information is an  only  It is not intended as medical advice for individual conditions or treatments  Talk to your doctor, nurse or pharmacist before following any medical regimen to see if it is safe and effective for you  © Copyright [address] Information is for End User's use only and may not be sold, redistributed or otherwise used for commercial purposes   All illustrations and images included in CareNotes® are the copyrighted property of [employer]  or [address] [place] [address]

## 2021-06-24 NOTE — H&P
History and Physical -  [facility]  Feli Owens 64 y o  female MRN: [id number]                  HPI: Feli Owens is a 64y o  year old female who presents for EGD and screening colonoscopy  Has a hx of Barretts without dysplasia, hiatal hernia s/p TIF procedure 4 y ago  Also has a hx of colon cancer running in family  Colonoscopy was due last year but because of covid she rescheduled to today  Did have some vomiting with prep but otherwise finished it and clear BMs in the morning  No other acute complaints  On prilosec prn  REVIEW OF SYSTEMS: Per the HPI, and otherwise unremarkable      Historical Information   Past Medical History:   Diagnosis Date    Anxiety     Basal cell adenocarcinoma     Bipolar 1 disorder ([facility] [address] )     Colon polyp     Depression     last assessed 06/10/2014    GERD (gastroesophageal reflux disease)     Hiatal hernia     History of Harris's esophagus      Past Surgical History:   Procedure Laterality Date     SECTION      COLONOSCOPY      HIATAL HERNIA REPAIR  2017    JOINT REPLACEMENT      bilateral knee replacements    KNEE SURGERY      left , right    MOHS SURGERY      TRANSORAL INCISIONLESS FUNDOPLICATION (TIF)       Social History   Social History     Substance and Sexual Activity   Alcohol Use Not Currently     Social History     Substance and Sexual Activity   Drug Use No     Social History     Tobacco Use   Smoking Status Never Smoker   Smokeless Tobacco Never Used     Family History   Problem Relation Age of Onset    Hypertension Mother     Osteoporosis Mother     Dementia Mother     Heart disease Father         cardiac disorder , colon cancer, diabetes    Colon cancer Father 61    Thyroid disease Sister     Cancer Brother         bladder cancer    Thyroid disease Daughter     No Known Problems Sister     No Known Problems Sister     No Known Problems Sister     No Known Problems Sister     No Known Problems Sister     No Known Problems Sister     No Known Problems Daughter        Meds/Allergies       Current Outpatient Medications:     ALPRAZolam (XANAX) 0 25 mg tablet    clonazePAM (KlonoPIN) 1 mg tablet    escitalopram (LEXAPRO) 10 mg tablet    omeprazole (PriLOSEC) 20 mg delayed release capsule    OXcarbazepine (TRILEPTAL) 150 mg tablet    Current Facility-Administered Medications:     sodium chloride 0 9 % infusion, 30 mL/hr, Intravenous, Continuous    sodium chloride 0 9 % infusion, 20 mL/hr, Intravenous, Continuous    Allergies   Allergen Reactions    Codeine Other (See Comments)     Dizzy    Morphine Itching       Objective     /74   Pulse 74   Temp (!) 95 7 °F (35 4 °C) (Temporal)   Resp 18   Ht 5' 2" (1 575 m)   Wt 79 4 kg (175 lb)   SpO2 99%   BMI 32 01 kg/m²       PHYSICAL EXAM    Gen: NAD  Head: NCAT  CV: RRR  CHEST: Clear  ABD: soft, NT/ND  EXT: no edema      ASSESSMENT/PLAN:  This is a 64y o  year old female here for EGD and screening colonoscopy, and she is stable and optimized for her procedure

## 2021-06-24 NOTE — ANESTHESIA PREPROCEDURE EVALUATION
Procedure:  COLONOSCOPY  EGD    Relevant Problems   CARDIO   (+) Hyperlipidemia      GI/HEPATIC   (+) GERD (gastroesophageal reflux disease)      MUSCULOSKELETAL   (+) Osteoarthritis of right knee      NEURO/PSYCH   (+) Depression, major, recurrent, moderate (HCC)   (+) Generalized anxiety disorder      PULMONARY   (+) BONNY (obstructive sleep apnea)        Physical Exam    Airway    Mallampati score: I  TM Distance: >3 FB  Neck ROM: full     Dental   No notable dental hx     Cardiovascular  Rhythm: regular, Rate: normal, Cardiovascular exam normal    Pulmonary  Pulmonary exam normal     Other Findings        Anesthesia Plan  ASA Score- 2     Anesthesia Type- IV sedation with anesthesia with ASA Monitors  Additional Monitors:   Airway Plan:           Plan Factors-Exercise tolerance (METS): >4 METS  Chart reviewed  EKG reviewed  Imaging results reviewed  Existing labs reviewed  Patient summary reviewed  Induction- intravenous  Postoperative Plan-     Informed Consent- Anesthetic plan and risks discussed with patient

## 2021-07-16 ENCOUNTER — TELEPHONE (OUTPATIENT)
Dept: GASTROENTEROLOGY | Facility: CLINIC | Age: 61
End: 2021-07-16

## 2021-07-16 NOTE — TELEPHONE ENCOUNTER
----- Message from Faustino Loco MD sent at 7/13/2021  4:55 PM EDT -----  Change recall for egd and colonoscopy in 3 years   ----- Message -----  From: Dayne Dc LPN  Sent: 0/26/6210   4:17 PM EDT  To: Faustino Loco MD    Pt informed of results  She states at her procedure you recommended both EGD/colon recall should be 3 years  She does have a hx of mcclure's and a family hx of colon cancer

## 2021-08-19 ENCOUNTER — TELEPHONE (OUTPATIENT)
Dept: GASTROENTEROLOGY | Facility: CLINIC | Age: 61
End: 2021-08-19

## 2021-08-19 DIAGNOSIS — K22.70 BARRETT'S ESOPHAGUS WITHOUT DYSPLASIA: ICD-10-CM

## 2021-08-19 DIAGNOSIS — K44.9 HIATAL HERNIA: ICD-10-CM

## 2021-08-19 RX ORDER — FAMOTIDINE 40 MG/1
40 TABLET, FILM COATED ORAL
Qty: 30 TABLET | Refills: 1 | Status: SHIPPED | OUTPATIENT
Start: 2021-08-19 | End: 2021-10-22 | Stop reason: SDUPTHER

## 2021-08-19 NOTE — TELEPHONE ENCOUNTER
Pt requesting refill on Famotadine   Please send to her pharmacy, she is scheduled for FU on 10/22/21

## 2021-08-26 ENCOUNTER — HOSPITAL ENCOUNTER (OUTPATIENT)
Dept: MRI IMAGING | Facility: HOSPITAL | Age: 61
Discharge: HOME | End: 2021-08-26
Payer: COMMERCIAL

## 2021-08-26 DIAGNOSIS — R41.3 MEMORY LOSS: ICD-10-CM

## 2021-08-26 PROCEDURE — 70551 MRI BRAIN STEM W/O DYE: CPT

## 2021-08-30 ENCOUNTER — HOSPITAL ENCOUNTER (OUTPATIENT)
Dept: NEUROLOGY | Facility: HOSPITAL | Age: 61
Discharge: HOME | End: 2021-08-30
Attending: PSYCHIATRY & NEUROLOGY
Payer: COMMERCIAL

## 2021-08-30 DIAGNOSIS — R41.3 MEMORY LOSS: ICD-10-CM

## 2021-08-30 PROCEDURE — 95816 EEG AWAKE AND DROWSY: CPT

## 2021-10-22 ENCOUNTER — OFFICE VISIT (OUTPATIENT)
Dept: GASTROENTEROLOGY | Facility: CLINIC | Age: 61
End: 2021-10-22
Payer: COMMERCIAL

## 2021-10-22 VITALS
DIASTOLIC BLOOD PRESSURE: 84 MMHG | HEIGHT: 62 IN | WEIGHT: 175 LBS | SYSTOLIC BLOOD PRESSURE: 111 MMHG | BODY MASS INDEX: 32.2 KG/M2 | HEART RATE: 78 BPM

## 2021-10-22 DIAGNOSIS — D12.6 ADENOMATOUS POLYP OF COLON, UNSPECIFIED PART OF COLON: Primary | ICD-10-CM

## 2021-10-22 DIAGNOSIS — K22.70 BARRETT'S ESOPHAGUS WITHOUT DYSPLASIA: ICD-10-CM

## 2021-10-22 DIAGNOSIS — K44.9 HIATAL HERNIA: ICD-10-CM

## 2021-10-22 PROCEDURE — 99214 OFFICE O/P EST MOD 30 MIN: CPT | Performed by: INTERNAL MEDICINE

## 2021-10-22 RX ORDER — FAMOTIDINE 40 MG/1
40 TABLET, FILM COATED ORAL
Qty: 90 TABLET | Refills: 1 | Status: SHIPPED | OUTPATIENT
Start: 2021-10-22 | End: 2022-04-21

## 2021-10-22 RX ORDER — OMEPRAZOLE 20 MG/1
20 CAPSULE, DELAYED RELEASE ORAL DAILY
Qty: 30 CAPSULE | Refills: 1 | Status: SHIPPED | OUTPATIENT
Start: 2021-10-22 | End: 2021-12-21

## 2021-10-25 ENCOUNTER — HOSPITAL ENCOUNTER (OUTPATIENT)
Dept: NEUROLOGY | Facility: CLINIC | Age: 61
Discharge: HOME | End: 2021-10-25

## 2021-10-25 ENCOUNTER — OFFICE VISIT (OUTPATIENT)
Dept: SLEEP CENTER | Facility: CLINIC | Age: 61
End: 2021-10-25
Payer: COMMERCIAL

## 2021-10-25 VITALS
HEART RATE: 77 BPM | SYSTOLIC BLOOD PRESSURE: 122 MMHG | HEIGHT: 62 IN | WEIGHT: 171.6 LBS | DIASTOLIC BLOOD PRESSURE: 80 MMHG | BODY MASS INDEX: 31.58 KG/M2

## 2021-10-25 DIAGNOSIS — F45.8 BRUXISM: ICD-10-CM

## 2021-10-25 DIAGNOSIS — G40.219 LOCALIZATION-RELATED EPILEPSY WITH COMPLEX PARTIAL SEIZURES WITH INTRACTABLE EPILEPSY (HCC): ICD-10-CM

## 2021-10-25 DIAGNOSIS — G47.19 EXCESSIVE DAYTIME SLEEPINESS: ICD-10-CM

## 2021-10-25 DIAGNOSIS — R41.3 MEMORY LOSS: ICD-10-CM

## 2021-10-25 DIAGNOSIS — E66.9 OBESITY (BMI 30-39.9): ICD-10-CM

## 2021-10-25 DIAGNOSIS — G47.33 OBSTRUCTIVE SLEEP APNEA (ADULT) (PEDIATRIC): Primary | ICD-10-CM

## 2021-10-25 DIAGNOSIS — F50.2 BULIMIA NERVOSA: ICD-10-CM

## 2021-10-25 DIAGNOSIS — F31.9 BIPOLAR 1 DISORDER (HCC): ICD-10-CM

## 2021-10-25 DIAGNOSIS — R51.9 HEADACHE UPON AWAKENING: ICD-10-CM

## 2021-10-25 DIAGNOSIS — K21.9 GASTROESOPHAGEAL REFLUX DISEASE, UNSPECIFIED WHETHER ESOPHAGITIS PRESENT: ICD-10-CM

## 2021-10-25 PROCEDURE — 99244 OFF/OP CNSLTJ NEW/EST MOD 40: CPT | Performed by: INTERNAL MEDICINE

## 2021-10-26 ENCOUNTER — HOSPITAL ENCOUNTER (OUTPATIENT)
Dept: NEUROLOGY | Facility: CLINIC | Age: 61
Discharge: HOME | End: 2021-10-26
Payer: COMMERCIAL

## 2021-10-26 DIAGNOSIS — G40.219 LOCALIZATION-RELATED EPILEPSY WITH COMPLEX PARTIAL SEIZURES WITH INTRACTABLE EPILEPSY (HCC): ICD-10-CM

## 2021-10-26 PROCEDURE — 95708 EEG WO VID EA 12-26HR UNMNTR: CPT

## 2021-10-27 ENCOUNTER — HOSPITAL ENCOUNTER (OUTPATIENT)
Dept: NEUROLOGY | Facility: CLINIC | Age: 61
Discharge: HOME | End: 2021-10-27
Payer: COMMERCIAL

## 2021-10-27 ENCOUNTER — HOSPITAL ENCOUNTER (OUTPATIENT)
Dept: SLEEP CENTER | Facility: CLINIC | Age: 61
Discharge: HOME | End: 2021-10-27
Payer: COMMERCIAL

## 2021-10-27 DIAGNOSIS — G47.19 EXCESSIVE DAYTIME SLEEPINESS: ICD-10-CM

## 2021-10-27 DIAGNOSIS — G47.33 OBSTRUCTIVE SLEEP APNEA (ADULT) (PEDIATRIC): ICD-10-CM

## 2021-10-27 DIAGNOSIS — G40.219 LOCALIZATION-RELATED EPILEPSY WITH COMPLEX PARTIAL SEIZURES WITH INTRACTABLE EPILEPSY (HCC): ICD-10-CM

## 2021-10-27 DIAGNOSIS — R51.9 HEADACHE UPON AWAKENING: ICD-10-CM

## 2021-10-27 PROCEDURE — G0399 HOME SLEEP TEST/TYPE 3 PORTA: HCPCS

## 2021-10-27 PROCEDURE — 95708 EEG WO VID EA 12-26HR UNMNTR: CPT

## 2021-10-29 ENCOUNTER — TELEPHONE (OUTPATIENT)
Dept: SLEEP CENTER | Facility: CLINIC | Age: 61
End: 2021-10-29

## 2021-12-13 ENCOUNTER — OFFICE VISIT (OUTPATIENT)
Dept: SLEEP CENTER | Facility: CLINIC | Age: 61
End: 2021-12-13
Payer: COMMERCIAL

## 2021-12-13 VITALS
WEIGHT: 174 LBS | BODY MASS INDEX: 32.02 KG/M2 | DIASTOLIC BLOOD PRESSURE: 70 MMHG | HEIGHT: 62 IN | SYSTOLIC BLOOD PRESSURE: 118 MMHG

## 2021-12-13 DIAGNOSIS — F31.9 BIPOLAR 1 DISORDER (HCC): ICD-10-CM

## 2021-12-13 DIAGNOSIS — R51.9 HEADACHE UPON AWAKENING: ICD-10-CM

## 2021-12-13 DIAGNOSIS — F45.8 BRUXISM: ICD-10-CM

## 2021-12-13 DIAGNOSIS — G47.33 OBSTRUCTIVE SLEEP APNEA (ADULT) (PEDIATRIC): Primary | ICD-10-CM

## 2021-12-13 DIAGNOSIS — G47.19 EXCESSIVE DAYTIME SLEEPINESS: ICD-10-CM

## 2021-12-13 DIAGNOSIS — G47.34 SLEEP RELATED HYPOXIA: ICD-10-CM

## 2021-12-13 DIAGNOSIS — K21.9 GASTROESOPHAGEAL REFLUX DISEASE, UNSPECIFIED WHETHER ESOPHAGITIS PRESENT: ICD-10-CM

## 2021-12-13 DIAGNOSIS — R41.3 MEMORY LOSS: ICD-10-CM

## 2021-12-13 PROCEDURE — 99214 OFFICE O/P EST MOD 30 MIN: CPT | Performed by: INTERNAL MEDICINE

## 2021-12-21 DIAGNOSIS — K22.70 BARRETT'S ESOPHAGUS WITHOUT DYSPLASIA: ICD-10-CM

## 2021-12-21 DIAGNOSIS — K44.9 HIATAL HERNIA: ICD-10-CM

## 2021-12-21 RX ORDER — OMEPRAZOLE 20 MG/1
CAPSULE, DELAYED RELEASE ORAL
Qty: 30 CAPSULE | Refills: 1 | Status: SHIPPED | OUTPATIENT
Start: 2021-12-21 | End: 2022-02-21

## 2022-01-25 ENCOUNTER — ANNUAL EXAM (OUTPATIENT)
Dept: OBGYN CLINIC | Facility: CLINIC | Age: 62
End: 2022-01-25
Payer: COMMERCIAL

## 2022-01-25 VITALS
DIASTOLIC BLOOD PRESSURE: 74 MMHG | WEIGHT: 174.2 LBS | SYSTOLIC BLOOD PRESSURE: 126 MMHG | HEIGHT: 60 IN | BODY MASS INDEX: 34.2 KG/M2

## 2022-01-25 DIAGNOSIS — Z12.31 ENCOUNTER FOR SCREENING MAMMOGRAM FOR MALIGNANT NEOPLASM OF BREAST: ICD-10-CM

## 2022-01-25 DIAGNOSIS — Z01.419 ENCNTR FOR GYN EXAM (GENERAL) (ROUTINE) W/O ABN FINDINGS: Primary | ICD-10-CM

## 2022-01-25 PROCEDURE — S0612 ANNUAL GYNECOLOGICAL EXAMINA: HCPCS | Performed by: PHYSICIAN ASSISTANT

## 2022-01-25 NOTE — PROGRESS NOTES
Kye Heller   1960    CC:  Yearly exam    S:  63 y o  female here for yearly exam  She is postmenopausal and has had no vaginal bleeding  She denies vaginal discharge, itching, odor or dryness  Sexual activity: She is not sexually active with her  due to his ED and her vaginal dryness  She is not interested in treatment for this  Last Pap: 10/1/19 neg/neg  Last Mammo:  11/12/20 neg  Last Colonoscopy: 6/24/21 polyps - repeat 3 years       We reviewed [facility] guidelines for Pap testing       Family hx of breast cancer: no  Family hx of ovarian cancer: no  Family hx of colon cancer: father      Current Outpatient Medications:     ALPRAZolam (XANAX) 0 25 mg tablet, take 1-2 tablets by mouth daily if needed, Disp: , Rfl: 0    clonazePAM (KlonoPIN) 1 mg tablet, Take 1 mg by mouth daily at bedtime, Disp: , Rfl:     escitalopram (LEXAPRO) 10 mg tablet, Take 10 mg by mouth every morning, Disp: , Rfl: 0    omeprazole (PriLOSEC) 20 mg delayed release capsule, take 1 capsule by mouth once daily, Disp: 30 capsule, Rfl: 1    OXcarbazepine (TRILEPTAL) 150 mg tablet, , Disp: , Rfl: 0    famotidine (PEPCID) 40 MG tablet, Take 1 tablet (40 mg total) by mouth daily at bedtime, Disp: 90 tablet, Rfl: 1  Social History     Socioeconomic History    Marital status: /Civil Union     Spouse name: Not on file    Number of children: Not on file    Years of education: Not on file    Highest education level: Not on file   Occupational History    Not on file   Tobacco Use    Smoking status: Never Smoker    Smokeless tobacco: Never Used   Vaping Use    Vaping Use: Never used   Substance and Sexual Activity    Alcohol use: Not Currently    Drug use: No    Sexual activity: Not Currently     Partners: Male   Other Topics Concern    Not on file   Social History Narrative    Partner had vasectomy     Social Determinants of Health     Financial Resource Strain: Not on file   Food Insecurity: Not on file Transportation Needs: Not on file   Physical Activity: Not on file   Stress: Not on file   Social Connections: Not on file   Intimate Partner Violence: Not on file   Housing Stability: Not on file     Family History   Problem Relation Age of Onset    Hypertension Mother     Osteoporosis Mother     Dementia Mother     Heart disease Father         cardiac disorder , colon cancer, diabetes    Colon cancer Father 61    Thyroid disease Sister     Cancer Brother         bladder cancer    Thyroid disease Daughter     No Known Problems Sister     No Known Problems Sister     No Known Problems Sister     No Known Problems Sister     No Known Problems Sister     No Known Problems Sister     No Known Problems Daughter      Past Medical History:   Diagnosis Date    Anxiety     Basal cell adenocarcinoma     B[facility] 1 disorder ([facility] [address] [facility]     Colon polyp     Depression     last assessed 06/10/2014    GERD (gastroesophageal reflux disease)     Hiatal hernia     History of Harris's esophagus         Review of Systems   Respiratory: Negative  Cardiovascular: Negative  Gastrointestinal: Negative for constipation and diarrhea  Genitourinary: Negative for difficulty urinating, pelvic pain, vaginal bleeding, vaginal discharge, itching or odor  O:  Blood pressure 126/74, height 5' 0 24" (1 53 m), weight 79 kg (174 lb 3 2 oz), not currently breastfeeding  Patient appears well and is not in distress  Neck is supple without masses  Breasts are symmetrical without mass, tenderness, nipple discharge, skin changes or adenopathy  Abdomen is soft and nontender without masses  External genitals are normal without lesions or rashes  Urethral meatus and urethra are normal  Bladder is normal to palpation  Vagina is normal without discharge or bleeding  Cervix is normal without discharge or lesion  Uterus is normal, mobile, nontender without palpable mass    Adnexa are normal, nontender, without palpable mass      A:  Yearly exam      P:   Pap 2024   Mammo slip given   Colonoscopy due 2024    RTO one year for yearly exam or sooner as needed

## 2022-02-20 DIAGNOSIS — K44.9 HIATAL HERNIA: ICD-10-CM

## 2022-02-20 DIAGNOSIS — K22.70 BARRETT'S ESOPHAGUS WITHOUT DYSPLASIA: ICD-10-CM

## 2022-02-21 RX ORDER — OMEPRAZOLE 20 MG/1
CAPSULE, DELAYED RELEASE ORAL
Qty: 30 CAPSULE | Refills: 1 | Status: SHIPPED | OUTPATIENT
Start: 2022-02-21 | End: 2022-04-21

## 2022-03-20 ENCOUNTER — HOSPITAL ENCOUNTER (OUTPATIENT)
Dept: SLEEP CENTER | Facility: CLINIC | Age: 62
Discharge: HOME | End: 2022-03-20
Payer: COMMERCIAL

## 2022-03-20 DIAGNOSIS — G47.34 SLEEP RELATED HYPOXIA: ICD-10-CM

## 2022-03-20 DIAGNOSIS — G47.33 OBSTRUCTIVE SLEEP APNEA (ADULT) (PEDIATRIC): ICD-10-CM

## 2022-03-20 PROCEDURE — 95811 POLYSOM 6/>YRS CPAP 4/> PARM: CPT

## 2022-03-21 ENCOUNTER — TELEPHONE (OUTPATIENT)
Dept: SLEEP CENTER | Facility: CLINIC | Age: 62
End: 2022-03-21

## 2022-03-21 NOTE — TELEPHONE ENCOUNTER
Left message for the patient that sleep study is resulted and to call back for results       CPAP study resulted and patient is scheduled for DME set up and compliance  Details of appointment left in message       Appointment information e mailed to Dillan Chu

## 2022-03-21 NOTE — PROGRESS NOTES
Sleep Study Documentation    Pre-Sleep Study       Sleep testing procedure explained to patient:YES    Patient napped prior to study:YES- less than 30 minutes  Napped after 2PM: no    Caffeine:Dayshift worker after 12PM   Caffeine use:YES- energy drinks  1 serving    Alcohol:Dayshift workers after 5PM: Alcohol use:NO    Typical day for patient:YES       Study Documentation    Sleep Study Indications: G47 [id number]    Sleep Study: Treatment   Optimal PAP pressure: 6  Leak:Small  Snore:Eliminated  REM Obtained:yes  Supplemental O2: no    Minimum SaO2 90   Baseline SaO2 98  PAP mask tried (list all) Resmed Airfit N20  PAP mask choice (final) Resmed Airfit N20  PAP mask type:nasal  PAP pressure at which snoring was eliminated 6  Minimum SaO2 at final PAP pressure 92  Mode of Therapy:CPAP  ETCO2:No  CPAP changed to BiPAP:No    Mode of Therapy:    EKG abnormalities: no     EEG abnormalities: no    Sleep Study Recorded < 2 hours: N/A    Sleep Study Recorded > 2 hours but incomplete study: N/A    Sleep Study Recorded 6 hours but no sleep obtained: NO    Patient classification:        Post-Sleep Study    Medication used at bedtime or during sleep study:YES prescription sleep aid and other prescription medications    Patient reports time it took to fall asleep:20 to 30 minutes    Patient reports waking up during study:1 to 2 times  Patient reports returning to sleep without difficulty  Patient reports sleeping 6 to 8 hours without dreaming  Patient reports sleep during study:better than usual    Patient rated sleepiness: Not sleepy or tired    PAP treatment:yes: Post PAP treatment patient reports feeling better and  would wear PAP mask at home

## 2022-04-21 DIAGNOSIS — K44.9 HIATAL HERNIA: ICD-10-CM

## 2022-04-21 DIAGNOSIS — K22.70 BARRETT'S ESOPHAGUS WITHOUT DYSPLASIA: ICD-10-CM

## 2022-04-21 RX ORDER — OMEPRAZOLE 20 MG/1
CAPSULE, DELAYED RELEASE ORAL
Qty: 30 CAPSULE | Refills: 1 | Status: SHIPPED | OUTPATIENT
Start: 2022-04-21 | End: 2022-06-20

## 2022-04-21 RX ORDER — FAMOTIDINE 40 MG/1
TABLET, FILM COATED ORAL
Qty: 90 TABLET | Refills: 1 | Status: SHIPPED | OUTPATIENT
Start: 2022-04-21

## 2022-04-24 NOTE — PROGRESS NOTES
Anat Torrez  1960    S:  56 y o  female here for a problem visit  She reports an itchy, burny, red rash under her pannus and sometimes under her breasts  This has gone on intermittently for years  She also notes discomfort with intercourse  They have tried a water based lubricant but she doesn't feel like this works well enough  Past Medical History:   Diagnosis Date    Depression     last assessed 06/10/2014    Malignant melanoma ([address] )     last assessed 06/10/2014     Family History   Problem Relation Age of Onset    Hypertension Mother     Osteoporosis Mother     Heart disease Father      cardiac disorder , colon cancer, diabetes    Thyroid disease Sister      Social History     Social History    Marital status: /Civil Union     Spouse name: N/A    Number of children: N/A    Years of education: N/A     Social History Main Topics    Smoking status: Never Smoker    Smokeless tobacco: Never Used    Alcohol use None    Drug use: Unknown    Sexual activity: Not Asked     Other Topics Concern    None     Social History Narrative    Partner had vasectomy           O:  /74 (BP Location: Right arm, Patient Position: Sitting, Cuff Size: Standard)   Wt 83 kg (183 lb)   BMI 34 02 kg/m²   She appears well and is in no distress  Abdomen is soft and nontender  External genitals are normal without lesions or rashes  Vagina is normal, no discharge or bleeding noted  Moderate atrophy noted  Lower abdomen has an erythematous fungal rash extending into the groin creases  A/P:  Fungal dermatitis  Nystatin powder BID x 14 days, call in 2 weeks if not all better  Vaginal atrophy - silicone based lubricant   If not helpful can add estrace cream which we discussed today FAMILY HISTORY:  Father  Still living? Unknown  FH: hypertension, Age at diagnosis: Age Unknown

## 2022-05-02 ENCOUNTER — OFFICE VISIT (OUTPATIENT)
Dept: SLEEP CENTER | Facility: CLINIC | Age: 62
End: 2022-05-02
Payer: COMMERCIAL

## 2022-05-02 VITALS
DIASTOLIC BLOOD PRESSURE: 82 MMHG | HEIGHT: 60 IN | SYSTOLIC BLOOD PRESSURE: 122 MMHG | OXYGEN SATURATION: 97 % | WEIGHT: 180 LBS | BODY MASS INDEX: 35.34 KG/M2 | HEART RATE: 86 BPM

## 2022-05-02 DIAGNOSIS — K21.9 GASTROESOPHAGEAL REFLUX DISEASE, UNSPECIFIED WHETHER ESOPHAGITIS PRESENT: ICD-10-CM

## 2022-05-02 DIAGNOSIS — R68.2 DRY MOUTH: ICD-10-CM

## 2022-05-02 DIAGNOSIS — R51.9 HEADACHE UPON AWAKENING: ICD-10-CM

## 2022-05-02 DIAGNOSIS — G47.33 OSA (OBSTRUCTIVE SLEEP APNEA): Primary | ICD-10-CM

## 2022-05-02 DIAGNOSIS — F45.8 BRUXISM: ICD-10-CM

## 2022-05-02 DIAGNOSIS — G47.19 EXCESSIVE DAYTIME SLEEPINESS: ICD-10-CM

## 2022-05-02 DIAGNOSIS — G47.34 SLEEP RELATED HYPOXIA: ICD-10-CM

## 2022-05-02 DIAGNOSIS — E66.9 OBESITY (BMI 30-39.9): ICD-10-CM

## 2022-05-02 DIAGNOSIS — R41.3 MEMORY LOSS: ICD-10-CM

## 2022-05-02 DIAGNOSIS — F31.9 BIPOLAR 1 DISORDER (HCC): ICD-10-CM

## 2022-05-02 PROCEDURE — 99214 OFFICE O/P EST MOD 30 MIN: CPT | Performed by: INTERNAL MEDICINE

## 2022-05-02 NOTE — PATIENT INSTRUCTIONS

## 2022-05-02 NOTE — PROGRESS NOTES
Follow-Up Note - Sleep Center   Song Sofia  63 y o  female  [facility]:[birth year]  NPF:[id number]  DOS:5/2/2022    CC: I saw this patient for follow-up in clinic today for Sleep disordered breathing, Coexisting Sleep and Medical Problems  A sleep study to titrate Positive airway pressure (PAP) therapy was undertaken  Patient is here to review results and to [initiate] therapy  The study demonstrated sleep disordered breathing was adequately remediated with positive airway pressure at 6cm H2O  Auto titrating Pap 6-9 cm H2O was recommended  Home sleep testing in October of 2021 demonstrated : SOLOMON (respiratory event index of) 15 5 /hour  Minimum oxygen saturation 81%  and 44 6% of total sleep time was spent with saturations less than 90%  The snore index was 47 3 per  [facility], Problem List, Medications & Allergies were reviewed in EMR  Interval changes: [none reported ]   She  has a past medical history of Anxiety, Basal cell adenocarcinoma, Bipolar 1 disorder ([address] ), Colon polyp, Depression, GERD (gastroesophageal reflux disease), Hiatal hernia, and History of Harris's esophagus  She has a current medication list which includes the following prescription(s): alprazolam, clonazepam, escitalopram, famotidine, omeprazole, and oxcarbazepine  PHYSIOLOGICAL DATA REVIEW AND INTERPRETATION: [ ]  using PAP > 4 hours/night 77%  Estimated SOLOMON 1/hour with pressure of 8 5cm H2O [email or web address] percentile]]; Patient has not been using ozone based devices to sanitize the machine  SUBJECTIVE: Regarding use of PAP, Song Blunt reports:   · She is experiencing some adverse effects: dry mouth/throat; has [not] noticed any fibres or foreign material in air line  · She is[ ]benefiting from use: sleeping better   · Teeth grinding during sleep  Sleep Routine: Song Blunt reports getting 6-7 hrs sleep[  ]; she has difficulty initiating sleep, but not maintaining sleep    She arises with aid of an alarm and usually feels refreshed  Derick Holcomb reports [Excessive] [Daytime] Sleepiness, [feels drowsy [in the afternoons] and takes planned naps for 1-2 hours in the afternoon ] She rated [herself] at Total score: 7 /24 on the Ripley Sleepiness Scale  Habits:[ reports that she has never smoked  She has never used smokeless tobacco ], [ reports previous alcohol use ], [ reports no history of drug use ], Caffeine use:moderate until dinnertime; Exercise routine: sometimes[ ]  ROS: reviewed & as attached  [Significant for some weight gain because of stress at home and stress eating  She reported no nasal, respiratory or cardiac symptoms  She has bipolar and has ongoing feelings of anxiety and depression ]  Neurologists noted partial seizures on EEG  She is on Trileptal and Klonopin for bipolar likely also suppressing seizure activity  She feels memory has improved  EXAM: /82   Pulse 86   Ht 5' 0 24" (1 53 m)   Wt 81 6 kg (180 lb)   SpO2 97%   BMI 34 87 kg/m²     Patient is well groomed; well appearing  CNS: Alert, orientated, pressured speech  Psych: cooperative[and in no distress]  Mental state:[appears normal]  H&N: EOMI; NC/AT:[no] facial pressure marks, [no] rashes  Skin/Extrem: col & hydration [normal]; [no] edema  Resp: Respiratory effort [is normal]  [Physical findings otherwise essentially unchanged from previous ]    IMPRESSION: Problem List Items & Comorbidities Addressed this Visit    1  BONNY (obstructive sleep apnea)  PAP DME Resupply/Reorder   2  Sleep related hypoxia     3  Headache upon awakening     4  Excessive daytime sleepiness     5  Memory loss     6  Dry mouth     7  Bruxism     8  Gastroesophageal reflux disease, unspecified whether esophagitis present     9  [facility] ([facility] )     10  Obesity (BMI 30-39  9)         PLAN:  1  I reviewed [results of prior studies and] [physiologic data ]with the patient  2  [I discussed treatment options with risks and benefits ]  3   Treatment with PAP is medically necessary and Tunde Pore is agreable to continue use  4  Care of equipment, methods to improve comfort using PAP and importance of compliance with therapy were discussed  5  Pressure setting: continue 6-9 cmH2O     6  Rx provided to replace supplies and Care coordinated with DME provider  7  Multi component Cognitive behavioral therapy for Insomnia undertaken - Sleep Restriction, Stimulus control, Relaxation techniques and Sleep hygiene were discussed  She will likely had much less difficulty falling asleep if she eliminates daytime naps  8  [Discussed] strategies for weight [reduction]  9  Follow-up is advised in [1 Nedra You [or sooner if needed] [to monitor progress, compliance and to adjust therapy]  Thank you for allowing me to participate in the care of this patient  Sincerely,    Authenticated electronically by Kimmy Felton MD on 69/85/99   Board Certified Specialist     Portions of the record may have been created with voice recognition software  Occasional wrong word or "sound a like" substitutions may have occurred due to the inherent limitations of voice recognition software  There may also be notations and random deletions of words or characters from malfunctioning software  Read the chart carefully and recognize, using context, where substitutions/deletions have occurred

## 2022-05-02 NOTE — PROGRESS NOTES
Review of Systems      Genitourinary none   Cardiology none   Gastrointestinal none   Neurology forgetfulness, poor concentration or confusion,  and difficulty with memory   Constitutional fatigue   Integumentary none   Psychiatry anxiety and depression   Musculoskeletal none   Pulmonary none   ENT none   Endocrine none   Hematological none

## 2022-05-03 ENCOUNTER — TELEPHONE (OUTPATIENT)
Dept: SLEEP CENTER | Facility: CLINIC | Age: 62
End: 2022-05-03

## 2022-05-03 NOTE — TELEPHONE ENCOUNTER
DME Resupply order sent to [facility] through [place]

## 2022-06-20 DIAGNOSIS — K44.9 HIATAL HERNIA: ICD-10-CM

## 2022-06-20 DIAGNOSIS — K22.70 BARRETT'S ESOPHAGUS WITHOUT DYSPLASIA: ICD-10-CM

## 2022-06-20 RX ORDER — OMEPRAZOLE 20 MG/1
CAPSULE, DELAYED RELEASE ORAL
Qty: 30 CAPSULE | Refills: 1 | Status: SHIPPED | OUTPATIENT
Start: 2022-06-20

## 2022-08-20 DIAGNOSIS — K44.9 HIATAL HERNIA: ICD-10-CM

## 2022-08-20 DIAGNOSIS — K22.70 BARRETT'S ESOPHAGUS WITHOUT DYSPLASIA: ICD-10-CM

## 2022-08-20 RX ORDER — OMEPRAZOLE 20 MG/1
CAPSULE, DELAYED RELEASE ORAL
Qty: 30 CAPSULE | Refills: 1 | Status: SHIPPED | OUTPATIENT
Start: 2022-08-20 | End: 2022-10-18 | Stop reason: SDUPTHER

## 2022-10-04 ENCOUNTER — TELEPHONE (OUTPATIENT)
Dept: GASTROENTEROLOGY | Facility: CLINIC | Age: 62
End: 2022-10-04

## 2022-10-18 ENCOUNTER — OFFICE VISIT (OUTPATIENT)
Dept: GASTROENTEROLOGY | Facility: CLINIC | Age: 62
End: 2022-10-18
Payer: COMMERCIAL

## 2022-10-18 VITALS
WEIGHT: 178 LBS | HEIGHT: 60 IN | HEART RATE: 80 BPM | SYSTOLIC BLOOD PRESSURE: 123 MMHG | BODY MASS INDEX: 34.95 KG/M2 | DIASTOLIC BLOOD PRESSURE: 80 MMHG

## 2022-10-18 DIAGNOSIS — E66.9 OBESITY (BMI 30.0-34.9): ICD-10-CM

## 2022-10-18 DIAGNOSIS — K22.70 BARRETT'S ESOPHAGUS WITHOUT DYSPLASIA: ICD-10-CM

## 2022-10-18 DIAGNOSIS — K44.9 HIATAL HERNIA: ICD-10-CM

## 2022-10-18 DIAGNOSIS — D12.6 ADENOMATOUS POLYP OF COLON, UNSPECIFIED PART OF COLON: Primary | ICD-10-CM

## 2022-10-18 PROCEDURE — 99214 OFFICE O/P EST MOD 30 MIN: CPT | Performed by: INTERNAL MEDICINE

## 2022-10-18 RX ORDER — FAMOTIDINE 40 MG/1
40 TABLET, FILM COATED ORAL
Qty: 90 TABLET | Refills: 2 | Status: SHIPPED | OUTPATIENT
Start: 2022-10-18

## 2022-10-18 RX ORDER — OMEPRAZOLE 20 MG/1
20 CAPSULE, DELAYED RELEASE ORAL DAILY
Qty: 90 CAPSULE | Refills: 3 | Status: SHIPPED | OUTPATIENT
Start: 2022-10-18

## 2022-10-18 NOTE — PROGRESS NOTES
2 POC results and Glucose results notified to Dr. Jorge Caban. Awaiting lab result of Glucose level.       Jose Manuel Holman, MOLLY  10/11/21 1937 [facility] - Outpatient Follow-up Note  Mireya Quintero 63 y o  female MRN: [id number]  Encounter: [id number]          ASSESSMENT AND PLAN:      1  Harris's esophagus without dysplasia  Short-segment nondysplastic Harris esophagus, history of GERD along with hiatal hernia, status post hiatal hernia repair along with TIF, postprocedure she is doing very well, she denies any regurgitation or reflux symptoms, she is still on low dose of Prilosec and famotidine as needed, she is eating everything but denies any nausea or vomiting, no dysphagia or odynophagia, continue with both medication as needed, she will need repeat EGD in 3 years  - omeprazole (PriLOSEC) 20 mg delayed release capsule; Take 1 capsule (20 mg total) by mouth daily  Dispense: 90 capsule; Refill: 3  - famotidine (PEPCID) 40 MG tablet; Take 1 tablet (40 mg total) by mouth daily at bedtime  Dispense: 90 tablet; Refill: 2    2  Hiatal hernia  Status post hiatal hernia repair along with TIF 4 years ago  - omeprazole (PriLOSEC) 20 mg delayed release capsule; Take 1 capsule (20 mg total) by mouth daily  Dispense: 90 capsule; Refill: 3  - famotidine (PEPCID) 40 MG tablet; Take 1 tablet (40 mg total) by mouth daily at bedtime  Dispense: 90 tablet; Refill: 2    3  Adenomatous polyp of colon, unspecified part of colon  History of multiple colon polyps which are tubular adenoma, repeat colonoscopy will be in 3 years because of the large  tubular adenoma    4  Obesity (BMI 30 0-34  9)  Mild obesity, long discussion with patient regarding diet and exercise with 5-10% of the baseline weight loss, after hiatal hernia repair surgery she lost significant amount of weight which she has gained it back    Currently she is using CPAP machine for obstructive sleep apnea    ______________________________________________________________________    SUBJECTIVE:  Patient seen and examined, she come for follow-up, she is asking refill for famotidine and omeprazole, she denies any heartburn or reflux symptoms, she is status post hiatal hernia repair along with TIF, she currently denies any dysphagia, no odynophagia, no regurgitation, she still take omeprazole and famotidine as needed, she is tolerating most of the food, no nausea, no vomiting, no chronic NSAID use, no melena or rectal bleeding, she gained weight, current BMI is 34 76, she was evaluated for sleep apnea and found to have obstructive sleep apnea, she is using CPAP machine, she was also seen by neurologist who diagnosed her with partial seizure, she currently on Lexapro, Klonopin and oxcarbazepine      REVIEW OF SYSTEMS IS OTHERWISE NEGATIVE        Historical Information   Past Medical History:   Diagnosis Date   • Anxiety    • Basal cell adenocarcinoma    • Bipolar 1 disorder ([facility] [address] )    • Colon polyp    • Depression     last assessed 06/10/2014   • GERD (gastroesophageal reflux disease)    • Hiatal hernia    • History of Harris's esophagus      Past Surgical History:   Procedure Laterality Date   •  SECTION     • COLONOSCOPY     • HIATAL HERNIA REPAIR  2017   • JOINT REPLACEMENT      bilateral knee replacements   • KNEE SURGERY      left , right   • MOHS SURGERY     • TRANSORAL INCISIONLESS FUNDOPLICATION (TIF)       Social History   Social History     Substance and Sexual Activity   Alcohol Use Not Currently     Social History     Substance and Sexual Activity   Drug Use No     Social History     Tobacco Use   Smoking Status Never Smoker   Smokeless Tobacco Never Used     Family History   Problem Relation Age of Onset   • Hypertension Mother    • Osteoporosis Mother    • Dementia Mother    • Heart disease Father         cardiac disorder , colon cancer, diabetes   • Colon cancer Father 61   • Thyroid disease Sister    • Cancer Brother         bladder cancer   • Thyroid disease Daughter    • No Known Problems Sister    • No Known Problems Sister    • No Known Problems Sister    • No Known Problems Sister    • No Known Problems Sister    • No Known Problems Sister    • No Known Problems Daughter        Meds/Allergies       Current Outpatient Medications:   •  ALPRAZolam (XANAX) 0 25 mg tablet  •  clonazePAM (KlonoPIN) 1 mg tablet  •  escitalopram (LEXAPRO) 10 mg tablet  •  famotidine (PEPCID) 40 MG tablet  •  omeprazole (PriLOSEC) 20 mg delayed release capsule  •  OXcarbazepine (TRILEPTAL) 150 mg tablet    Allergies   Allergen Reactions   • Ospemifene Hyperactivity   • Codeine Other (See Comments)     Dizzy   • Morphine Itching           Objective     Blood pressure 123/80, pulse 80, height 5' (1 524 m), weight 80 7 kg (178 lb), not currently breastfeeding  Body mass index is 34 76 kg/m²  PHYSICAL EXAM:      General Appearance:   Alert, cooperative, no distress   HEENT:   Normocephalic, atraumatic, anicteric      Neck:  Supple, symmetrical, trachea midline   Lungs:   Clear to auscultation bilaterally; no rales, rhonchi or wheezing; respirations unlabored    Heart[de-identified]   Regular rate and rhythm; no murmur, rub, or gallop  Abdomen:   Soft, non-tender, non-distended; normal bowel sounds; no masses, no organomegaly    Genitalia:   Deferred    Rectal:   Deferred    Extremities:  No cyanosis, clubbing or edema    Pulses:  2+ and symmetric    Skin:  No jaundice, rashes, or lesions    Lymph nodes:  No palpable cervical lymphadenopathy        Lab Results:   No visits with results within 1 Day(s) from this visit     Latest known visit with results is:   Hospital Outpatient Visit on 06/24/2021   Component Date Value   • Case Report 06/24/2021                      Value:Surgical Pathology Report                         Case: [id number]                                   Authorizing Provider:  Sandra Rios MD           Collected:           06/24/2021 1032              Ordering Location:     [address] [place] [address] Received:            06/24/2021 2133                                     [facility] Pathologist:           Hailey Cullen MD                                                                  Specimens:   A) - Stomach, cold bx antrum erythema ck for hpy                                                    B) - Esophagus, cold bx lower esoph hx of barretts                                                  C) - Large Intestine, Sigmoid Colon, cold snare sigmoid polyp                                       D) - Large Intestine, Left/Descending Colon, cold bx descending polyp                               E) - Large Intestine, Sigmoid Colon, cold bx prox sigmoid polyps x2                       • Final Diagnosis 06/24/2021                      Value: This result contains rich text formatting which cannot be displayed here  • Additional Information 06/24/2021                      Value: This result contains rich text formatting which cannot be displayed here  • Synoptic Checklist 06/24/2021                      Value:  (COLON/RECTUM POLYP FORM - GI - All Specimens)                                                                                                                 : Adenoma(s)     • Gross Description 06/24/2021                      Value: This result contains rich text formatting which cannot be displayed here  Radiology Results:   No results found  Answers for HPI/ROS submitted by the patient on 10/17/2022  Chronicity: recurrent  Onset: more than 1 year ago  Onset quality: undetermined  Frequency: rarely  Progression since onset: gradually improving  Pain - numeric: 2/10  Pain quality: a sensation of fullness  Radiates to: does not radiate  anorexia: No  arthralgias: No  belching: Yes  constipation: Yes  diarrhea: Yes  dysuria: No  fever: No  flatus: Yes  frequency: No  headaches: No  hematochezia: No  hematuria: No  melena: No  myalgias: No  nausea:  No  weight loss: No  vomiting: No

## 2023-02-06 ENCOUNTER — ANNUAL EXAM (OUTPATIENT)
Dept: OBGYN CLINIC | Facility: CLINIC | Age: 63
End: 2023-02-06

## 2023-02-06 VITALS
DIASTOLIC BLOOD PRESSURE: 86 MMHG | WEIGHT: 182.2 LBS | SYSTOLIC BLOOD PRESSURE: 120 MMHG | BODY MASS INDEX: 34.4 KG/M2 | HEIGHT: 61 IN

## 2023-02-06 DIAGNOSIS — Z01.419 ROUTINE GYNECOLOGICAL EXAMINATION: Primary | ICD-10-CM

## 2023-02-06 DIAGNOSIS — Z12.31 ENCOUNTER FOR SCREENING MAMMOGRAM FOR MALIGNANT NEOPLASM OF BREAST: ICD-10-CM

## 2023-02-08 NOTE — PROGRESS NOTES
Assessment   63 y o  postmenopausal female presenting for annual exam      Plan   Diagnoses and all orders for this visit:    Routine gynecological examination  Normal findings on routine exam    considerations reviewed  Aware of sx to report  Breast awareness/SBE encouraged  Encouraged 150 min of exercise per week  Reviewed balanced diet  Vitamin D and Calcium supplement recommended  Encounter for screening mammogram for malignant neoplasm of breast  -     Mammo screening bilateral w 3d & cad; Future        Pap due 2024  Mammo slip given   Colonoscopy due 2024       RTO one year for yearly exam or sooner as needed  __________________________________________________________________      Subjective     Juanpablo Mata is a 63 y o  postmenopausal female presenting for annual exam  She is without complaint and does not want STD testing today  SCREENING  Last Pap: 10/01/2019 NILM/hpv neg   Last Mammo: 11/12/2020 BIRADS 1 - Negative  Last Colonoscopy: 06/24/2021 3 year recall due to polyps  Last DEXA: n/a    GYN  Denies postmenopausal vaginal bleeding, dryness, vaginal discharge, itching, odor, pelvic pain and vulvar/vaginal symptoms    Sexually active: No -  has MS and she has vaginal dryness     Hx Abnormal pap: denies  We reviewed ASCCP guidelines for Pap testing today  OB  [id number]        Complaints: denies  Denies leakage/difficulty urinating, dysuria, hematuria, and urinary frequency/urgency      BREAST  Complaints: denies  Denies: breast lump, breast tenderness, dryness, nipple discharge, pruritus, skin color change, and skin lesion(s)  Personal hx: none reported     GENERAL  PMH reviewed/updated and is as below  Patient does follow with a PCP      Pertinent Family Hx:   Family hx of breast cancer: no  Family hx of ovarian cancer: no  Family hx of colon cancer: father      Past Medical History:   Diagnosis Date   • Anxiety    • Basal cell adenocarcinoma    • Bipolar 1 disorder ([facility] )    • Colon polyp    • Depression     last assessed 06/10/2014   • GERD (gastroesophageal reflux disease)    • Hiatal hernia    • History of Harris's esophagus        Past Surgical History:   Procedure Laterality Date   •  SECTION     • COLONOSCOPY     • HIATAL HERNIA REPAIR  2017   • JOINT REPLACEMENT      bilateral knee replacements   • KNEE SURGERY      left , right   • MOHS SURGERY     • TRANSORAL INCISIONLESS FUNDOPLICATION (TIF)           Current Outpatient Medications:   •  ALPRAZolam (XANAX) 0 25 mg tablet, take 1-2 tablets by mouth daily if needed, Disp: , Rfl: 0  •  clonazePAM (KlonoPIN) 1 mg tablet, Take 1 mg by mouth daily at bedtime, Disp: , Rfl:   •  escitalopram (LEXAPRO) 10 mg tablet, Take 10 mg by mouth every morning, Disp: , Rfl: 0  •  omeprazole (PriLOSEC) 20 mg delayed release capsule, Take 1 capsule (20 mg total) by mouth daily, Disp: 90 capsule, Rfl: 3  •  OXcarbazepine (TRILEPTAL) 150 mg tablet, , Disp: , Rfl: 0  •  famotidine (PEPCID) 40 MG tablet, Take 1 tablet (40 mg total) by mouth daily at bedtime, Disp: 90 tablet, Rfl: 2    Allergies   Allergen Reactions   • Ospemifene Hyperactivity   • Codeine Other (See Comments)     Dizzy   • Morphine Itching       Social History     Socioeconomic History   • Marital status: /Civil Union     Spouse name: Not on file   • Number of children: Not on file   • Years of education: Not on file   • Highest education level: Not on file   Occupational History   • Not on file   Tobacco Use   • Smoking status: Never   • Smokeless tobacco: Never   Vaping Use   • Vaping Use: Never used   Substance and Sexual Activity   • Alcohol use: Not Currently   • Drug use: No   • Sexual activity: Not Currently     Partners: Male   Other Topics Concern   • Not on file   Social History Narrative    Partner had vasectomy     Social Determinants of Health     Financial Resource Strain: Not on file   Food Insecurity: Not on file   Transportation Needs: Not on file   Physical Activity: Not on file   Stress: Not on file   Social Connections: Not on file   Intimate Partner Violence: Not on file   Housing Stability: Not on file         Review of Systems     ROS:  Constitutional: Negative for appetite change, fatigue and unexpected weight change  Respiratory: Negative  Cardiovascular: Negative  Gastrointestinal: Negative for abdominal pain and change in bowel habits/constipation/diarrhea  Breasts:  Negative other than as noted above  Genitourinary: Negative other than as noted above  Psychiatric: Negative for mood difficulties  Objective      /86 (BP Location: Right arm, Patient Position: Sitting, Cuff Size: Large)   Ht 5' 0 5" (1 537 m)   Wt 82 6 kg (182 lb 3 2 oz)   BMI 35 00 kg/m²     Physical Examination:  Patient appears well and is not in distress  Neck is supple without masses  Breasts are symmetrical without mass, tenderness, nipple discharge, skin changes or adenopathy  Abdomen is soft and nontender without masses  External genitals are normal without lesions or rashes  Urethral meatus and urethra are normal  Bladder is normal to palpation  Vagina is normal without discharge or bleeding  Atrophic changes  Cervix is normal without discharge or lesion  Uterus is normal, mobile, nontender without palpable mass  Adnexa are normal, nontender, without palpable mass

## 2023-03-22 ENCOUNTER — OFFICE VISIT (OUTPATIENT)
Dept: GASTROENTEROLOGY | Facility: CLINIC | Age: 63
End: 2023-03-22

## 2023-03-22 VITALS
SYSTOLIC BLOOD PRESSURE: 130 MMHG | HEART RATE: 71 BPM | DIASTOLIC BLOOD PRESSURE: 90 MMHG | HEIGHT: 61 IN | BODY MASS INDEX: 33.61 KG/M2 | WEIGHT: 178 LBS

## 2023-03-22 DIAGNOSIS — K59.04 CHRONIC IDIOPATHIC CONSTIPATION: Primary | ICD-10-CM

## 2023-03-22 DIAGNOSIS — Z80.0 FAMILY HISTORY OF COLON CANCER: ICD-10-CM

## 2023-03-22 DIAGNOSIS — K44.9 HIATAL HERNIA: ICD-10-CM

## 2023-03-22 DIAGNOSIS — K21.9 GASTROESOPHAGEAL REFLUX DISEASE WITHOUT ESOPHAGITIS: ICD-10-CM

## 2023-03-22 DIAGNOSIS — E66.9 OBESITY (BMI 30.0-34.9): ICD-10-CM

## 2023-03-22 DIAGNOSIS — K22.70 BARRETT'S ESOPHAGUS WITHOUT DYSPLASIA: ICD-10-CM

## 2023-03-22 RX ORDER — OMEPRAZOLE 20 MG/1
20 CAPSULE, DELAYED RELEASE ORAL DAILY
Qty: 90 CAPSULE | Refills: 3 | Status: SHIPPED | OUTPATIENT
Start: 2023-03-22

## 2023-03-22 NOTE — PROGRESS NOTES
[facility] - Outpatient Follow-up Note  Radha Gordon 63 y o  female MRN: [id number]  Encounter: [id number]          ASSESSMENT AND PLAN:      1  Chronic idiopathic constipation  New onset of constipation after recent viral gastroenteritis-like symptoms, advised to take Metamucil 1 teaspoon nightly, she is on multiple psychiatric medications for bipolar  Her overall fiber intake is also limited so I think Metamucil will be a better option for her, she is up-to-date with colonoscopy  - psyllium (METAMUCIL SMOOTH TEXTURE) 28 % packet; Take 1 packet by mouth in the morning  Dispense: 30 packet; Refill: 2    2  Gastroesophageal reflux disease without esophagitis  S/p hiatal hernia repair along with a TIF, postprocedure she is doing well, last office visit we discontinued famotidine and she is doing well, she is taking omeprazole 20 mg p o  every morning only    3  Obesity (BMI 30 0-34  9)  Long discussion with the patient about diet and exercise with 5 to 10% of the baseline weight loss    4  Family history of colon cancer  Personal history of colon polyp and family history of colon cancer, she will continue with surveillance colonoscopy every 3 years    11  Harris's esophagus without dysplasia  Repeat EGD in 3 years  - omeprazole (PriLOSEC) 20 mg delayed release capsule; Take 1 capsule (20 mg total) by mouth daily  Dispense: 90 capsule; Refill: 3    6  Hiatal hernia  S/p hiatal hernia repair along with a TIF  - omeprazole (PriLOSEC) 20 mg delayed release capsule; Take 1 capsule (20 mg total) by mouth daily  Dispense: 90 capsule; Refill: 3    ______________________________________________________________________    SUBJECTIVE: Patient seen and examined, she come for follow-up  Overall she is doing well, when she made her appointment, she was sick with viral gastroenteritis, norovirus infection    She recovered from infection and now denying any abdominal pain, no nausea, no vomiting, her bowel movements are still irregular  She has a history of chronic constipation, last office visit we discontinued famotidine and she is now taking only omeprazole in the morning small dose and she denying any nocturnal reflux or regurgitation, she is s/p EGD with TIF 5 years ago and postprocedure she is doing well      REVIEW OF SYSTEMS IS OTHERWISE NEGATIVE        Historical Information   Past Medical History:   Diagnosis Date   • Anxiety    • Harris esophagus    • Basal cell adenocarcinoma    • Bipolar 1 disorder ([facility])    • Colon polyp    • Depression     last assessed 06/10/2014   • Diverticulitis of colon    • GERD (gastroesophageal reflux disease)    • Hiatal hernia    • History of Ahrris's esophagus      Past Surgical History:   Procedure Laterality Date   •  SECTION     • COLONOSCOPY     • ENDOSCOPIC ULTRASOUND (UPPER)     • HERNIA REPAIR     • HIATAL HERNIA REPAIR  2017   • JOINT REPLACEMENT      bilateral knee replacements   • KNEE SURGERY      left , right   • MOHS SURGERY     • TRANSORAL INCISIONLESS FUNDOPLICATION (TIF)       Social History   Social History     Substance and Sexual Activity   Alcohol Use Not Currently     Social History     Substance and Sexual Activity   Drug Use No     Social History     Tobacco Use   Smoking Status Never   Smokeless Tobacco Never     Family History   Problem Relation Age of Onset   • Hypertension Mother          2016   • Osteoporosis Mother    • Dementia Mother    • Heart disease Father         Congestive heart failure  4/10/1991   • Colon cancer Father    • Colon polyps Father    • Depression Father    • Diabetes Father    • Hearing loss Father    • Vision loss Father    • Thyroid disease Sister    • ADD / ADHD Sister    • Cancer Brother         bladder cancer   • Thyroid disease Daughter    • Depression Sister    • Diabetes Sister          2021   • No Known Problems Sister    • No Known Problems Sister    • No Known Problems Sister    • No Known Problems Sister    • No Known Problems Sister    • No Known Problems Daughter        Meds/Allergies       Current Outpatient Medications:   •  ALPRAZolam (XANAX) 0 25 mg tablet  •  clonazePAM (KlonoPIN) 1 mg tablet  •  escitalopram (LEXAPRO) 10 mg tablet  •  omeprazole (PriLOSEC) 20 mg delayed release capsule  •  OXcarbazepine (TRILEPTAL) 150 mg tablet  •  psyllium (METAMUCIL SMOOTH TEXTURE) 28 % packet    Allergies   Allergen Reactions   • Ospemifene Hyperactivity   • Codeine Other (See Comments)     Dizzy   • Morphine Itching           Objective     Blood pressure 130/90, pulse 71, height 5' 1" (1 549 m), weight 80 7 kg (178 lb), not currently breastfeeding  Body mass index is 33 63 kg/m²  PHYSICAL EXAM:      General Appearance:   Alert, cooperative, no distress   HEENT:   Normocephalic, atraumatic, anicteric      Neck:  Supple, symmetrical, trachea midline   Lungs:   Clear to auscultation bilaterally; no rales, rhonchi or wheezing; respirations unlabored    Heart[de-identified]   Regular rate and rhythm; no murmur, rub, or gallop  Abdomen:   Soft, non-tender, non-distended; normal bowel sounds; no masses, no organomegaly    Genitalia:   Deferred    Rectal:   Deferred    Extremities:  No cyanosis, clubbing or edema    Pulses:  2+ and symmetric    Skin:  No jaundice, rashes, or lesions    Lymph nodes:  No palpable cervical lymphadenopathy        Lab Results:   No visits with results within 1 Day(s) from this visit     Latest known visit with results is:   Hospital Outpatient Visit on 06/24/2021   Component Date Value   • Case Report 06/24/2021                      Value:Surgical Pathology Report                         Case: [id number]                                   Authorizing Provider:  Madisyn Tovar MD           Collected:           06/24/2021 1032              Ordering Location:     [address] [place] [address] Received:            06/24/2021 12[address] [place]                                                                  Pathologist:           Lino Delgadillo MD                                                                  Specimens:   A) - Stomach, cold bx antrum erythema ck for hpy                                                    B) - Esophagus, cold bx lower esoph hx of barretts                                                  C) - Large Intestine, Sigmoid Colon, cold snare sigmoid polyp                                       D) - Large Intestine, Left/Descending Colon, cold bx descending polyp                               E) - Large Intestine, Sigmoid Colon, cold bx prox sigmoid polyps x2                       • Final Diagnosis 06/24/2021                      Value: This result contains rich text formatting which cannot be displayed here  • Additional Information 06/24/2021                      Value: This result contains rich text formatting which cannot be displayed here  • Synoptic Checklist 06/24/2021                      Value:  (COLON/RECTUM POLYP FORM - GI - All Specimens)                                                                                                                 : Adenoma(s)     • Gross Description 06/24/2021                      Value: This result contains rich text formatting which cannot be displayed here  Radiology Results:   No results found  Answers for HPI/ROS submitted by the patient on 3/15/2023  Chronicity: recurrent  Onset: more than 1 month ago  Onset quality: undetermined  Frequency: daily  Episode duration: 3 Hours  Progression since onset: gradually worsening  Pain location: suprapubic region  Pain - numeric: 5/10  Pain quality: cramping  Radiates to: suprapubic region  anorexia: Yes  arthralgias: No  belching: Yes  constipation: Yes  diarrhea: Yes  dysuria: No  fever: No  flatus: No  frequency: No  headaches: No  hematochezia: No  hematuria: No  melena: No  myalgias: No  nausea: No  weight loss: No  vomiting: No  Aggravated by: certain positions, eating  Relieved by: certain positions, passing flatus

## 2023-06-08 ENCOUNTER — HOSPITAL ENCOUNTER (OUTPATIENT)
Dept: RADIOLOGY | Age: 63
Discharge: HOME | End: 2023-06-08
Payer: COMMERCIAL

## 2023-06-08 VITALS — WEIGHT: 180 LBS | HEIGHT: 61 IN | BODY MASS INDEX: 33.99 KG/M2

## 2023-06-08 DIAGNOSIS — Z12.31 ENCOUNTER FOR SCREENING MAMMOGRAM FOR MALIGNANT NEOPLASM OF BREAST: ICD-10-CM

## 2023-06-08 PROCEDURE — 77067 SCR MAMMO BI INCL CAD: CPT

## 2023-06-08 PROCEDURE — 77063 BREAST TOMOSYNTHESIS BI: CPT

## 2023-11-09 ENCOUNTER — TELEPHONE (OUTPATIENT)
Dept: OBGYN CLINIC | Facility: CLINIC | Age: 63
End: 2023-11-09

## 2023-11-09 NOTE — TELEPHONE ENCOUNTER
Pt is calling because ARCHANA Mauricio had suggested she start a medication for vaginal atrophy on her annual exam visit this past February. Pt was prescribed OSPHENA by ARCHANA Vasquez but do to medications she's currently taking she can not take this. Pt would now like to start the new medication Nicolas Serrano had suggested. Please call to discuss.

## 2023-12-06 ENCOUNTER — OFFICE VISIT (OUTPATIENT)
Dept: OBGYN CLINIC | Facility: CLINIC | Age: 63
End: 2023-12-06

## 2023-12-06 VITALS
HEART RATE: 80 BPM | BODY MASS INDEX: 33.37 KG/M2 | DIASTOLIC BLOOD PRESSURE: 86 MMHG | WEIGHT: 176.6 LBS | SYSTOLIC BLOOD PRESSURE: 110 MMHG

## 2023-12-06 DIAGNOSIS — N95.2 ATROPHIC VAGINITIS: Primary | ICD-10-CM

## 2023-12-06 DIAGNOSIS — N39.3 SUI (STRESS URINARY INCONTINENCE, FEMALE): ICD-10-CM

## 2023-12-06 DIAGNOSIS — B37.9 YEAST INFECTION: ICD-10-CM

## 2023-12-06 RX ORDER — BENZONATATE 100 MG/1
CAPSULE ORAL
COMMUNITY
Start: 2023-11-26

## 2023-12-06 RX ORDER — ALBUTEROL SULFATE 90 UG/1
2 AEROSOL, METERED RESPIRATORY (INHALATION) EVERY 4 HOURS PRN
COMMUNITY
Start: 2023-11-29 | End: 2024-11-28

## 2023-12-06 RX ORDER — CLONAZEPAM 0.5 MG/1
TABLET ORAL
COMMUNITY
Start: 2023-11-24

## 2023-12-06 RX ORDER — NYSTATIN AND TRIAMCINOLONE ACETONIDE 100000; 1 [USP'U]/G; MG/G
OINTMENT TOPICAL 2 TIMES DAILY
Qty: 30 G | Refills: 0 | Status: SHIPPED | OUTPATIENT
Start: 2023-12-06 | End: 2023-12-20

## 2023-12-06 RX ORDER — FLUCONAZOLE 150 MG/1
TABLET ORAL
Qty: 2 TABLET | Refills: 0 | Status: SHIPPED | OUTPATIENT
Start: 2023-12-06 | End: 2023-12-10

## 2023-12-06 NOTE — PATIENT INSTRUCTIONS
Vaginal moisturizers (prevention)     Coconut oil (organic, pure, unscented) as needed for moisture or lubrication.    Revaree hyaluronic acid suppository   Replens moisture restore external comfort gel daily ( use as directed on the box)    Replens long lasting vaginal moisturizer  ( use as directed on the box)

## 2023-12-07 RX ORDER — ESTRADIOL 0.1 MG/G
CREAM VAGINAL
Qty: 42.5 G | Refills: 1 | Status: SHIPPED | OUTPATIENT
Start: 2023-12-07

## 2023-12-07 NOTE — PROGRESS NOTES
Briseyda Morrow  1960    S:  66 y.o. female with c/o with c/o vulvovaginal dryness. This has been ongoing for months. Recently worsening with dx of pneumonia and subsequent increase in JOSÉ MIGUEL sx. Previously had infrequent JOSÉ MIGUEL - only with cough or sneeze, but was never bothered by this. Now feels like she would like to do something to fix this. Feels like she is dry and irritated internally and externally. Completed 10 days of antibiotics and now with residual dry cough from inflammation. No discharge, itching, odor, pelvic pain, or bleeding. Review of Systems   Constitutional: Negative   Gastrointestinal: Negative  Genitourinary: as above     O:  /86 (BP Location: Left arm, Patient Position: Sitting, Cuff Size: Standard)   Pulse 80   Wt 80.1 kg (176 lb 9.6 oz)   BMI 33.37 kg/m²   She appears well and is in no distress  Normocephalic, atraumatic. Normal respiratory effort  Abdomen is soft and nontender  External genitals are erythematous with some excoriation markings. Vagina: tissue is atrophic. Small amount of thick leukorrhea. No  bleeding. Cervix is without lesions or discharge. No CMT. Uterus is nontender, no masses  Adnexa are nontender, no pelvic masses appreciated  No focal neurological deficits. Normal mood, affect, and behavior. A/P:  Diagnoses and all orders for this visit:    Atrophic vaginitis  -     estradiol (ESTRACE VAGINAL) 0.1 mg/g vaginal cream; 2g intravaginally at bedtime for one week, then 1g intravaginally 2 times per week    Yeast infection  -     fluconazole (DIFLUCAN) 150 mg tablet; Take one tablet now, then one tablet in 3 days. -     nystatin-triamcinolone (MYCOLOG-II) ointment; Apply topically 2 (two) times a day for 14 days    JOSÉ MIGUEL (stress urinary incontinence, female)  -     Ambulatory Referral to Urogynecology; Future        Previously had hyperactivity with ospemifene.  Discussed estrace cream and limited systemic absorption so would not expect this response. Discussed OTC moisturizers that may also provide some relief. Desires trial of estrace cream, which was sent. Rx for diflucan for yeast infection. Mycolog externally x 2 weeks to reduce external sx. To f/u with urogy for tx of JOSÉ MIGUEL. Encouraged to call if sx not fully resolved in 2 weeks. Sooner with worsening or concern.

## 2023-12-27 DIAGNOSIS — K22.70 BARRETT'S ESOPHAGUS WITHOUT DYSPLASIA: ICD-10-CM

## 2023-12-27 DIAGNOSIS — K44.9 HIATAL HERNIA: ICD-10-CM

## 2023-12-28 RX ORDER — OMEPRAZOLE 20 MG/1
20 CAPSULE, DELAYED RELEASE ORAL DAILY
Qty: 90 CAPSULE | Refills: 3 | Status: SHIPPED | OUTPATIENT
Start: 2023-12-28

## 2024-02-06 RX ORDER — DEXTROMETHORPHAN HYDROBROMIDE AND PROMETHAZINE HYDROCHLORIDE 15; 6.25 MG/5ML; MG/5ML
5 SYRUP ORAL 4 TIMES DAILY PRN
COMMUNITY
Start: 2024-01-04

## 2024-02-07 ENCOUNTER — CONSULT (OUTPATIENT)
Dept: PULMONOLOGY | Facility: CLINIC | Age: 64
End: 2024-02-07
Payer: COMMERCIAL

## 2024-02-07 VITALS
OXYGEN SATURATION: 97 % | SYSTOLIC BLOOD PRESSURE: 132 MMHG | BODY MASS INDEX: 33.07 KG/M2 | HEART RATE: 76 BPM | WEIGHT: 175 LBS | DIASTOLIC BLOOD PRESSURE: 90 MMHG | TEMPERATURE: 98 F

## 2024-02-07 DIAGNOSIS — F33.1 DEPRESSION, MAJOR, RECURRENT, MODERATE (HCC): ICD-10-CM

## 2024-02-07 DIAGNOSIS — G47.33 OSA (OBSTRUCTIVE SLEEP APNEA): ICD-10-CM

## 2024-02-07 DIAGNOSIS — K21.9 GASTROESOPHAGEAL REFLUX DISEASE, UNSPECIFIED WHETHER ESOPHAGITIS PRESENT: ICD-10-CM

## 2024-02-07 DIAGNOSIS — F41.1 GENERALIZED ANXIETY DISORDER: ICD-10-CM

## 2024-02-07 DIAGNOSIS — R05.3 CHRONIC COUGH: Primary | ICD-10-CM

## 2024-02-07 PROCEDURE — 94010 BREATHING CAPACITY TEST: CPT | Performed by: INTERNAL MEDICINE

## 2024-02-07 PROCEDURE — 99204 OFFICE O/P NEW MOD 45 MIN: CPT | Performed by: INTERNAL MEDICINE

## 2024-02-07 NOTE — PROGRESS NOTES
Pulmonary Consultation   Anna Sofia 63 y.o. female MRN: [id number]  2/7/2024      Reason for Consultation:  Pneumonia evaluation    Requested by: Dr. Dickson    Assessment:  Chronic cough  Suspect this to be related to post infectious likely pertussis  Infiltrates improving, cough improving  Reviewed diagnosis of likely pertussis despite negative PCR testing late into course  Spirometry normal today  Hold on further medication regimens given exacerbations to psychiatric care and codeine intolerances  Will continue to monitor and return in 3 months if not improving    Pneumonia 0- resolved on CXR, no further imaging needed    GERD/HH - continue PPI    Bipolar disorder, anxiety/depression - per psychiatry    Plan:    Diagnoses and all orders for this visit:    Chronic cough    Gastroesophageal reflux disease, unspecified whether esophagitis present    BONNY (obstructive sleep apnea)    Depression, major, recurrent, moderate (HCC)    Generalized anxiety disorder    Other orders  -     promethazine-dextromethorphan (PHENERGAN-DM) 6.25-15 mg/5 mL oral syrup; Take 5 mL by mouth 4 (four) times a day as needed (Patient not taking: Reported on 2/7/2024)        History of Present Illness   HPI:  Anna Sofia is a 63 y.o. female who has GERD, Harris's esophagus, hiatal hernia post repair 2015, constipation, BONNY,  Depression/anxiety/BP disorder.  She presents today with her  for cough and pneumonia evaluation. She reports she developed cough and dyspnea as well as viral prodrome in late Nov 2023. She required numerous visits to urgent care, PCP, and telemedicine. These were reviewed.  She had been given course of doxycyline and multiple OCS courses. She also had been given Wixela and ISACC in late Jan 2024 for further cough. She reported OCS and bronchodilator courses worsened her psychiatric disease and anxiety.  She reports 3 weeks into course she tested negative for FLU/RSV/COVID and Pertussis.  She  reported previously near syncopal episodes with cough and mild post tussive emesis.  She reports cough is improving and mostly nonproductive. She denied current dyspnea.     She reports typically respiratory infection once a year. She denied history of asthma, COPD, ILD, or VTE.    Review of Systems   Constitutional:  Negative for activity change, chills and fever.   HENT:  Negative for mouth sores, sore throat and trouble swallowing.    Respiratory:  Positive for cough. Negative for chest tightness, shortness of breath and wheezing.    Cardiovascular:  Negative for chest pain and leg swelling.   Gastrointestinal:  Negative for abdominal distention, abdominal pain, nausea and vomiting.   Musculoskeletal:  Negative for gait problem.   Allergic/Immunologic: Negative for immunocompromised state.   Neurological:  Negative for headaches.   Hematological:  Negative for adenopathy.   Psychiatric/Behavioral:  Negative for sleep disturbance. The patient is nervous/anxious.        Historical Information   Past Medical History:   Diagnosis Date    Anxiety     Harris esophagus 2019    Basal cell adenocarcinoma     Bipolar 1 disorder (HCC)     Colon polyp     Depression     last assessed 06/10/2014    Diverticulitis of colon     GERD (gastroesophageal reflux disease)     Hiatal hernia     History of Harris's esophagus      Past Surgical History:   Procedure Laterality Date     SECTION      COLONOSCOPY      ENDOSCOPIC ULTRASOUND (UPPER)      HERNIA REPAIR      HIATAL HERNIA REPAIR  2017    JOINT REPLACEMENT      bilateral knee replacements    KNEE SURGERY      left , right    MOHS SURGERY      TRANSORAL INCISIONLESS FUNDOPLICATION (TIF)       Family History   Problem Relation Age of Onset    Hypertension Mother          2016    Osteoporosis Mother     Dementia Mother     Heart disease Father         Congestive heart failure  4/10/1991    Colon cancer Father 60    Colon polyps Father      Depression Father     Diabetes Father     Hearing loss Father     Vision loss Father     Thyroid disease Sister     ADD / ADHD Sister     Cancer Brother         bladder cancer    Thyroid disease Daughter     Depression Sister     Diabetes Sister          2021    No Known Problems Sister     No Known Problems Sister     No Known Problems Sister     No Known Problems Sister     No Known Problems Sister     No Known Problems Daughter        Occupational History:     Social History: 2 pet cats, nonsmoker    Meds/Allergies     Current Outpatient Medications:     clonazePAM (KlonoPIN) 0.5 mg tablet, 1.5 mg, Disp: , Rfl:     escitalopram (LEXAPRO) 10 mg tablet, Take 10 mg by mouth every morning, Disp: , Rfl: 0    nystatin-triamcinolone (MYCOLOG-II) ointment, Apply topically 2 (two) times a day for 14 days, Disp: 30 g, Rfl: 0    omeprazole (PriLOSEC) 20 mg delayed release capsule, Take 1 capsule (20 mg total) by mouth daily, Disp: 90 capsule, Rfl: 3    OXcarbazepine (TRILEPTAL) 150 mg tablet, , Disp: , Rfl: 0    albuterol (PROVENTIL HFA,VENTOLIN HFA) 90 mcg/act inhaler, Inhale 2 puffs every 4 (four) hours as needed (Patient not taking: Reported on 2024), Disp: , Rfl:     ALPRAZolam (XANAX) 0.25 mg tablet, take 1-2 tablets by mouth daily if needed (Patient not taking: Reported on 2024), Disp: , Rfl: 0    benzonatate (TESSALON PERLES) 100 mg capsule, TAKE 1 CAPSULE BY MOUTH 3 TIMES A DAY AS NEEDED FOR COUGH X 10 DAYS (Patient not taking: Reported on 2024), Disp: , Rfl:     clonazePAM (KlonoPIN) 1 mg tablet, Take 1 mg by mouth daily at bedtime (Patient not taking: Reported on 2024), Disp: , Rfl:     estradiol (ESTRACE VAGINAL) 0.1 mg/g vaginal cream, 2g intravaginally at bedtime for one week, then 1g intravaginally 2 times per week (Patient not taking: Reported on 2024), Disp: 42.5 g, Rfl: 1    promethazine-dextromethorphan (PHENERGAN-DM) 6.25-15 mg/5 mL oral syrup, Take 5  mL by mouth 4 (four) times a day as needed (Patient not taking: Reported on 2/7/2024), Disp: , Rfl:     psyllium (METAMUCIL SMOOTH TEXTURE) 28 % packet, Take 1 packet by mouth in the morning (Patient not taking: Reported on 12/6/2023), Disp: 30 packet, Rfl: 2  Allergies   Allergen Reactions    Ospemifene Hyperactivity    Codeine Other (See Comments)     Dizzy    Morphine Itching       Vitals: Blood pressure 132/90, pulse 76, temperature 98 °F (36.7 °C), weight 79.4 kg (175 lb), SpO2 97%, not currently breastfeeding., Body mass index is 33.07 kg/m². Oxygen Therapy  SpO2: 97 %    Physical Exam  Physical Exam  Vitals reviewed.   Constitutional:       General: She is not in acute distress.     Appearance: Normal appearance. She is well-developed and normal weight. She is not ill-appearing, toxic-appearing or diaphoretic.   HENT:      Head: Normocephalic and atraumatic.      Right Ear: External ear normal.      Left Ear: External ear normal.      Nose: Nose normal. No congestion or rhinorrhea.      Mouth/Throat:      Mouth: Mucous membranes are moist.      Pharynx: Oropharynx is clear. No oropharyngeal exudate.      Comments: No thrush  Eyes:      General: No scleral icterus.        Right eye: No discharge.         Left eye: No discharge.      Conjunctiva/sclera: Conjunctivae normal.      Pupils: Pupils are equal, round, and reactive to light.   Neck:      Vascular: No JVD.      Trachea: No tracheal deviation.   Cardiovascular:      Rate and Rhythm: Normal rate and regular rhythm.      Heart sounds: Normal heart sounds. No murmur heard.     No gallop.   Pulmonary:      Effort: No respiratory distress.      Breath sounds: Normal breath sounds. No stridor. No wheezing, rhonchi or rales.      Comments: Intermittent barking cough episodes  Abdominal:      General: Bowel sounds are normal. There is no distension.      Palpations: Abdomen is soft.      Tenderness: There is no abdominal tenderness. There is no guarding or  "rebound.   Musculoskeletal:         General: No deformity.      Right lower leg: No edema.      Left lower leg: No edema.   Lymphadenopathy:      Cervical: No cervical adenopathy.   Skin:     General: Skin is warm and dry.      Coloration: Skin is not jaundiced.      Findings: No erythema or rash.   Neurological:      General: No focal deficit present.      Mental Status: She is alert and oriented to person, place, and time. Mental status is at baseline.   Psychiatric:         Mood and Affect: Mood normal.         Behavior: Behavior normal.         Thought Content: Thought content normal.         Labs: I have personally reviewed pertinent lab results.  Lab Results   Component Value Date    WBC 7.42 2021    HGB 14.2 2021    HCT 43.1 2021    MCV 94 2021     2021     Lab Results   Component Value Date    CALCIUM 9.3 2024    K 4.1 2024    CO2 29 2024     2024    BUN 8 2024    CREATININE 0.73 2024     No results found for: \"IGE\"  Lab Results   Component Value Date    ALT 26 2024    AST 27 2024    ALKPHOS 94 2024       Imaging and other studies: I have personally reviewed pertinent reports.   and I have personally reviewed pertinent films in PACS  CXR 2024 - resolving lingular infiltrate, no dense consolidations, no PTX     CT angio 2023 - no PE, mild lingular infiltrate vs atelectasis, no effusions, no PTX    Pulmonary function testin2024 - spirometry - Ratio 83%, FVC 2.56L (88%), FEV1 2.12L (94%) - normal spirometry      Booker Goff DO, FACP  [facility]  "

## 2024-03-13 DIAGNOSIS — N95.2 ATROPHIC VAGINITIS: ICD-10-CM

## 2024-03-13 RX ORDER — ESTRADIOL 0.1 MG/G
CREAM VAGINAL
Qty: 42.5 G | Refills: 1 | Status: SHIPPED | OUTPATIENT
Start: 2024-03-13

## 2024-05-29 ENCOUNTER — PREP FOR PROCEDURE (OUTPATIENT)
Dept: GASTROENTEROLOGY | Facility: MEDICAL CENTER | Age: 64
End: 2024-05-29

## 2024-05-29 ENCOUNTER — TELEPHONE (OUTPATIENT)
Dept: GASTROENTEROLOGY | Facility: MEDICAL CENTER | Age: 64
End: 2024-05-29

## 2024-05-29 ENCOUNTER — OFFICE VISIT (OUTPATIENT)
Dept: GASTROENTEROLOGY | Facility: MEDICAL CENTER | Age: 64
End: 2024-05-29
Payer: COMMERCIAL

## 2024-05-29 VITALS
SYSTOLIC BLOOD PRESSURE: 124 MMHG | DIASTOLIC BLOOD PRESSURE: 82 MMHG | WEIGHT: 177.2 LBS | HEART RATE: 78 BPM | TEMPERATURE: 97.9 F | BODY MASS INDEX: 33.48 KG/M2

## 2024-05-29 DIAGNOSIS — K44.9 HIATAL HERNIA: ICD-10-CM

## 2024-05-29 DIAGNOSIS — K22.70 BARRETT'S ESOPHAGUS WITHOUT DYSPLASIA: ICD-10-CM

## 2024-05-29 DIAGNOSIS — Z80.0 FAMILY HISTORY OF COLON CANCER: ICD-10-CM

## 2024-05-29 DIAGNOSIS — Z86.010 PERSONAL HISTORY OF COLONIC POLYPS: ICD-10-CM

## 2024-05-29 DIAGNOSIS — K21.9 GASTROESOPHAGEAL REFLUX DISEASE, UNSPECIFIED WHETHER ESOPHAGITIS PRESENT: Primary | ICD-10-CM

## 2024-05-29 PROCEDURE — 99214 OFFICE O/P EST MOD 30 MIN: CPT | Performed by: PHYSICIAN ASSISTANT

## 2024-05-29 RX ORDER — SOD SULF/POT CHLORIDE/MAG SULF 1.479 G
TABLET ORAL
Qty: 24 TABLET | Refills: 0 | Status: SHIPPED | OUTPATIENT
Start: 2024-05-29

## 2024-05-29 RX ORDER — OMEPRAZOLE 20 MG/1
20 CAPSULE, DELAYED RELEASE ORAL DAILY
Qty: 90 CAPSULE | Refills: 3 | Status: SHIPPED | OUTPATIENT
Start: 2024-05-29

## 2024-05-29 NOTE — PROGRESS NOTES
[facility] - Outpatient Follow-up Note  Anna Sofia 63 y.o. female MRN: [id number]  Encounter: [id number]        Assessment and Plan    1. GERD  S/p TIF and hiatal hernia repair.  Typically well controlled on omeprazole 20mg every morning however she states that she has been taking over-the-counter medication and it is not working as well.  -Continue omeprazole 20mg every morning, provided the patient with a prescription and if this is not working well she will let us know so we can increase the dosage    2. 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 Recall 3 years  -Due for repeat EGD, discussed in detail    3. Personal history of colon polyps   4. Family history of colon cancer  Last colonoscopy 6/24/21 with 4 polyps removed two of which were tubular adenomas, recall 3 years. Father had colon cancer, diagnosed in his 50s  -Due for colonoscopy, discussed in detail  -I obtained informed consent from the patient. The risks/benefits/alternatives of the procedure were discussed with the patient. Risks included, but not limited to, infection, bleeding, perforation, injury to organs in the abdomen, missed lesion and incomplete procedure were discussed. Patient was agreeable and electronic signature was obtained.    Follow up as needed as patient is moving     ______________________________________________________________________    History of Present Illness  Anna Sofia is a 63 y.o. female with bipolar disease here for follow up evaluation of GERD, Harris's esophagus, and a personal history of colon polyps.  The patient has chronic acid reflux typically well-controlled on omeprazole 20 mg once daily.  She states that this usually works very well for her but she recently ran out of her prescription and has been using over-the-counter medication which is not working as well.  She has been taking Tums on a daily basis.  Otherwise she has no  GI symptoms or complaints.  Her last EGD and colonoscopy were in 2021.  EGD revealed Harris's esophagus without dysplasia colonoscopy revealed 4 polyps which were removed.  2 were precancerous and recall was recommended 3 years later.      Review of Systems   Constitutional:  Negative for activity change, appetite change, chills, fatigue, fever and unexpected weight change.   Gastrointestinal:  Positive for abdominal pain. Negative for constipation, diarrhea, nausea and vomiting.   Genitourinary:  Negative for dysuria, frequency and hematuria.   Musculoskeletal:  Negative for arthralgias and myalgias.   Neurological:  Negative for headaches.       Past Medical History  Past Medical History:   Diagnosis Date    Anxiety     Harris esophagus 2019    Basal cell adenocarcinoma     Bipolar 1 disorder (HCC)     Colon polyp     Depression     last assessed 06/10/2014    Diverticulitis of colon     GERD (gastroesophageal reflux disease)     Hiatal hernia     History of Harris's esophagus        Past Social history  Past Surgical History:   Procedure Laterality Date     SECTION      COLONOSCOPY      ENDOSCOPIC ULTRASOUND (UPPER)      HERNIA REPAIR  2015    HIATAL HERNIA REPAIR  2017    JOINT REPLACEMENT      bilateral knee replacements    KNEE SURGERY      left , right    MOHS SURGERY      TRANSORAL INCISIONLESS FUNDOPLICATION (TIF)       Social History     Socioeconomic History    Marital status: /Civil Union     Spouse name: Not on file    Number of children: Not on file    Years of education: Not on file    Highest education level: Not on file   Occupational History    Not on file   Tobacco Use    Smoking status: Never    Smokeless tobacco: Never   Vaping Use    Vaping status: Never Used   Substance and Sexual Activity    Alcohol use: Not Currently    Drug use: No    Sexual activity: Not Currently     Partners: Male     Birth control/protection: Post-menopausal, Sponge, Male Sterilization    Other Topics Concern    Not on file   Social History Narrative    Partner had vasectomy     Social Determinants of Health     Financial Resource Strain: Not on file   Food Insecurity: Not on file   Transportation Needs: Not on file   Physical Activity: Not on file   Stress: Not on file   Social Connections: Not on file   Intimate Partner Violence: Not on file   Housing Stability: Not on file     Social History     Substance and Sexual Activity   Alcohol Use Not Currently     Social History     Substance and Sexual Activity   Drug Use No     Social History     Tobacco Use   Smoking Status Never   Smokeless Tobacco Never       Past Family History  Family History   Problem Relation Age of Onset    Hypertension Mother          2016    Osteoporosis Mother     Dementia Mother     Heart disease Father         Congestive heart failure  4/10/1991    Colon cancer Father 60    Colon polyps Father     Depression Father     Diabetes Father     Hearing loss Father     Vision loss Father     Thyroid disease Sister     ADD / ADHD Sister     Cancer Brother         bladder cancer    Thyroid disease Daughter     Depression Sister     Diabetes Sister          2021    No Known Problems Sister     No Known Problems Sister     No Known Problems Sister     No Known Problems Sister     No Known Problems Sister     No Known Problems Daughter        Current Medications  Current Outpatient Medications   Medication Sig Dispense Refill    clonazePAM (KlonoPIN) 0.5 mg tablet 1.5 mg      escitalopram (LEXAPRO) 10 mg tablet Take 10 mg by mouth every morning  0    omeprazole (PriLOSEC) 20 mg delayed release capsule Take 1 capsule (20 mg total) by mouth daily 90 capsule 3    OXcarbazepine (TRILEPTAL) 150 mg tablet   0    albuterol (PROVENTIL HFA,VENTOLIN HFA) 90 mcg/act inhaler Inhale 2 puffs every 4 (four) hours as needed (Patient not taking: Reported on 2024)      ALPRAZolam (XANAX) 0.25 mg tablet take 1-2  tablets by mouth daily if needed (Patient not taking: Reported on 2/7/2024)  0    benzonatate (TESSALON PERLES) 100 mg capsule TAKE 1 CAPSULE BY MOUTH 3 TIMES A DAY AS NEEDED FOR COUGH X 10 DAYS (Patient not taking: Reported on 2/7/2024)      clonazePAM (KlonoPIN) 1 mg tablet Take 1 mg by mouth daily at bedtime      estradiol (ESTRACE VAGINAL) 0.1 mg/g vaginal cream 2g intravaginally at bedtime for one week, then 1g intravaginally 2 times per week 42.5 g 1    nystatin-triamcinolone (MYCOLOG-II) ointment Apply topically 2 (two) times a day for 14 days 30 g 0    promethazine-dextromethorphan (PHENERGAN-DM) 6.25-15 mg/5 mL oral syrup Take 5 mL by mouth 4 (four) times a day as needed (Patient not taking: Reported on 2/7/2024)      psyllium (METAMUCIL SMOOTH TEXTURE) 28 % packet Take 1 packet by mouth in the morning (Patient not taking: Reported on 12/6/2023) 30 packet 2     No current facility-administered medications for this visit.       Allergies  Allergies   Allergen Reactions    Ospemifene Hyperactivity    Codeine Other (See Comments)     Dizzy    Morphine Itching         The following portions of the patient's history were reviewed and updated as appropriate: allergies, current medications, past medical history, past social history, past surgical history and problem list.      Vitals  Vitals:    05/29/24 1023   Weight: 80.4 kg (177 lb 3.2 oz)         Physical Exam  Constitutional   General appearance: Patient is seated and in no acute distress, well appearing and well nourished.   Head and Face   Head and face: Normal.    Eyes   Conjunctiva and lids: No erythema, swelling or discharge.  Anicteric.  Ears, Nose, Mouth, and Throat   Hearing: Normal.    Neck: Supple, trachea midline.  Pulmonary   Respiratory effort: No increased work of breathing or signs of respiratory distress.    Cardiovascular   Examination of extremities for edema and/or varicosities: Normal.    Musculoskeletal   Gait and station: Normal   Skin    Skin and subcutaneous tissue: Warm, dry, and intact. No visible jaundice, lesions or rashes.  Psychiatric   Judgment and insight: Normal  Recent and remote memory:  Normal  Mood and affect: Normal      Results  No visits with results within 1 Day(s) from this visit.   Latest known visit with results is:   Hospital Outpatient Visit on 06/24/2021   Component Date Value    Case Report 06/24/2021                      Value:Surgical Pathology Report                         Case: [id number]                                   Authorizing Provider:  Javier Velez MD           Collected:           06/24/2021 1032              Ordering Location:     [facility] Received:            06/24/2021 2133                                     [place]                                                                  Pathologist:           Nichol Greco MD                                                                  Specimens:   A) - Stomach, cold bx antrum erythema ck for hpy                                                    B) - Esophagus, cold bx lower esoph hx of barretts                                                  C) - Large Intestine, Sigmoid Colon, cold snare sigmoid polyp                                       D) - Large Intestine, Left/Descending Colon, cold bx descending polyp                               E) - Large Intestine, Sigmoid Colon, cold bx prox sigmoid polyps x2                        Final Diagnosis 06/24/2021                      Value:This result contains rich text formatting which cannot be displayed here.    Additional Information 06/24/2021                      Value:This result contains rich text formatting which cannot be displayed here.    Synoptic Checklist 06/24/2021                      Value:  (COLON/RECTUM POLYP FORM - GI - All Specimens)                                                                                                                 :    Adenoma(s)      Gross  Description 06/24/2021                      Value:This result contains rich text formatting which cannot be displayed here.       Radiology Results  No results found.    Orders  No orders of the defined types were placed in this encounter.        Answers submitted by the patient for this visit:  Abdominal Pain Questionnaire (Submitted on 5/23/2024)  Chief Complaint: Abdominal pain  Chronicity: recurrent  Onset: more than 1 year ago  Onset quality: undetermined  Frequency: rarely  Progression since onset: unchanged  Pain location: LUQ, RUQ  Pain - numeric: 2/10  Pain quality: burning  Radiates to: does not radiate  anorexia: No  belching: Yes  flatus: Yes  hematochezia: No  melena: No  weight loss: No  Relieved by: bowel movements  Diagnostic workup: GI consult, lower endoscopy, surgery

## 2024-05-29 NOTE — H&P (VIEW-ONLY)
[facility] - Outpatient Follow-up Note  Anna Sofia 63 y.o. female MRN: [id number]  Encounter: [id number]        Assessment and Plan    1. GERD  S/p TIF and hiatal hernia repair.  Typically well controlled on omeprazole 20mg every morning however she states that she has been taking over-the-counter medication and it is not working as well.  -Continue omeprazole 20mg every morning, provided the patient with a prescription and if this is not working well she will let us know so we can increase the dosage    2. 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 Recall 3 years  -Due for repeat EGD, discussed in detail    3. Personal history of colon polyps   4. Family history of colon cancer  Last colonoscopy 6/24/21 with 4 polyps removed two of which were tubular adenomas, recall 3 years. Father had colon cancer, diagnosed in his 50s  -Due for colonoscopy, discussed in detail  -I obtained informed consent from the patient. The risks/benefits/alternatives of the procedure were discussed with the patient. Risks included, but not limited to, infection, bleeding, perforation, injury to organs in the abdomen, missed lesion and incomplete procedure were discussed. Patient was agreeable and electronic signature was obtained.    Follow up as needed as patient is moving     ______________________________________________________________________    History of Present Illness  Anna Sofia is a 63 y.o. female with bipolar disease here for follow up evaluation of GERD, Harris's esophagus, and a personal history of colon polyps.  The patient has chronic acid reflux typically well-controlled on omeprazole 20 mg once daily.  She states that this usually works very well for her but she recently ran out of her prescription and has been using over-the-counter medication which is not working as well.  She has been taking Tums on a daily basis.  Otherwise she has no  GI symptoms or complaints.  Her last EGD and colonoscopy were in 2021.  EGD revealed Harris's esophagus without dysplasia colonoscopy revealed 4 polyps which were removed.  2 were precancerous and recall was recommended 3 years later.      Review of Systems   Constitutional:  Negative for activity change, appetite change, chills, fatigue, fever and unexpected weight change.   Gastrointestinal:  Positive for abdominal pain. Negative for constipation, diarrhea, nausea and vomiting.   Genitourinary:  Negative for dysuria, frequency and hematuria.   Musculoskeletal:  Negative for arthralgias and myalgias.   Neurological:  Negative for headaches.       Past Medical History  Past Medical History:   Diagnosis Date    Anxiety     Harris esophagus 2019    Basal cell adenocarcinoma     Bipolar 1 disorder (HCC)     Colon polyp     Depression     last assessed 06/10/2014    Diverticulitis of colon     GERD (gastroesophageal reflux disease)     Hiatal hernia     History of Harris's esophagus        Past Social history  Past Surgical History:   Procedure Laterality Date     SECTION      COLONOSCOPY      ENDOSCOPIC ULTRASOUND (UPPER)      HERNIA REPAIR  2015    HIATAL HERNIA REPAIR  2017    JOINT REPLACEMENT      bilateral knee replacements    KNEE SURGERY      left , right    MOHS SURGERY      TRANSORAL INCISIONLESS FUNDOPLICATION (TIF)       Social History     Socioeconomic History    Marital status: /Civil Union     Spouse name: Not on file    Number of children: Not on file    Years of education: Not on file    Highest education level: Not on file   Occupational History    Not on file   Tobacco Use    Smoking status: Never    Smokeless tobacco: Never   Vaping Use    Vaping status: Never Used   Substance and Sexual Activity    Alcohol use: Not Currently    Drug use: No    Sexual activity: Not Currently     Partners: Male     Birth control/protection: Post-menopausal, Sponge, Male Sterilization    Other Topics Concern    Not on file   Social History Narrative    Partner had vasectomy     Social Determinants of Health     Financial Resource Strain: Not on file   Food Insecurity: Not on file   Transportation Needs: Not on file   Physical Activity: Not on file   Stress: Not on file   Social Connections: Not on file   Intimate Partner Violence: Not on file   Housing Stability: Not on file     Social History     Substance and Sexual Activity   Alcohol Use Not Currently     Social History     Substance and Sexual Activity   Drug Use No     Social History     Tobacco Use   Smoking Status Never   Smokeless Tobacco Never       Past Family History  Family History   Problem Relation Age of Onset    Hypertension Mother          2016    Osteoporosis Mother     Dementia Mother     Heart disease Father         Congestive heart failure  4/10/1991    Colon cancer Father 60    Colon polyps Father     Depression Father     Diabetes Father     Hearing loss Father     Vision loss Father     Thyroid disease Sister     ADD / ADHD Sister     Cancer Brother         bladder cancer    Thyroid disease Daughter     Depression Sister     Diabetes Sister          2021    No Known Problems Sister     No Known Problems Sister     No Known Problems Sister     No Known Problems Sister     No Known Problems Sister     No Known Problems Daughter        Current Medications  Current Outpatient Medications   Medication Sig Dispense Refill    clonazePAM (KlonoPIN) 0.5 mg tablet 1.5 mg      escitalopram (LEXAPRO) 10 mg tablet Take 10 mg by mouth every morning  0    omeprazole (PriLOSEC) 20 mg delayed release capsule Take 1 capsule (20 mg total) by mouth daily 90 capsule 3    OXcarbazepine (TRILEPTAL) 150 mg tablet   0    albuterol (PROVENTIL HFA,VENTOLIN HFA) 90 mcg/act inhaler Inhale 2 puffs every 4 (four) hours as needed (Patient not taking: Reported on 2024)      ALPRAZolam (XANAX) 0.25 mg tablet take 1-2  tablets by mouth daily if needed (Patient not taking: Reported on 2/7/2024)  0    benzonatate (TESSALON PERLES) 100 mg capsule TAKE 1 CAPSULE BY MOUTH 3 TIMES A DAY AS NEEDED FOR COUGH X 10 DAYS (Patient not taking: Reported on 2/7/2024)      clonazePAM (KlonoPIN) 1 mg tablet Take 1 mg by mouth daily at bedtime      estradiol (ESTRACE VAGINAL) 0.1 mg/g vaginal cream 2g intravaginally at bedtime for one week, then 1g intravaginally 2 times per week 42.5 g 1    nystatin-triamcinolone (MYCOLOG-II) ointment Apply topically 2 (two) times a day for 14 days 30 g 0    promethazine-dextromethorphan (PHENERGAN-DM) 6.25-15 mg/5 mL oral syrup Take 5 mL by mouth 4 (four) times a day as needed (Patient not taking: Reported on 2/7/2024)      psyllium (METAMUCIL SMOOTH TEXTURE) 28 % packet Take 1 packet by mouth in the morning (Patient not taking: Reported on 12/6/2023) 30 packet 2     No current facility-administered medications for this visit.       Allergies  Allergies   Allergen Reactions    Ospemifene Hyperactivity    Codeine Other (See Comments)     Dizzy    Morphine Itching         The following portions of the patient's history were reviewed and updated as appropriate: allergies, current medications, past medical history, past social history, past surgical history and problem list.      Vitals  Vitals:    05/29/24 1023   Weight: 80.4 kg (177 lb 3.2 oz)         Physical Exam  Constitutional   General appearance: Patient is seated and in no acute distress, well appearing and well nourished.   Head and Face   Head and face: Normal.    Eyes   Conjunctiva and lids: No erythema, swelling or discharge.  Anicteric.  Ears, Nose, Mouth, and Throat   Hearing: Normal.    Neck: Supple, trachea midline.  Pulmonary   Respiratory effort: No increased work of breathing or signs of respiratory distress.    Cardiovascular   Examination of extremities for edema and/or varicosities: Normal.    Musculoskeletal   Gait and station: Normal   Skin    Skin and subcutaneous tissue: Warm, dry, and intact. No visible jaundice, lesions or rashes.  Psychiatric   Judgment and insight: Normal  Recent and remote memory:  Normal  Mood and affect: Normal      Results  No visits with results within 1 Day(s) from this visit.   Latest known visit with results is:   Hospital Outpatient Visit on 06/24/2021   Component Date Value    Case Report 06/24/2021                      Value:Surgical Pathology Report                         Case: [id number]                                   Authorizing Provider:  Javier Velez MD           Collected:           06/24/2021 1032              Ordering Location:     [facility] Received:            06/24/2021 2133                                     [place]                                                                  Pathologist:           Nichol Greco MD                                                                  Specimens:   A) - Stomach, cold bx antrum erythema ck for hpy                                                    B) - Esophagus, cold bx lower esoph hx of barretts                                                  C) - Large Intestine, Sigmoid Colon, cold snare sigmoid polyp                                       D) - Large Intestine, Left/Descending Colon, cold bx descending polyp                               E) - Large Intestine, Sigmoid Colon, cold bx prox sigmoid polyps x2                        Final Diagnosis 06/24/2021                      Value:This result contains rich text formatting which cannot be displayed here.    Additional Information 06/24/2021                      Value:This result contains rich text formatting which cannot be displayed here.    Synoptic Checklist 06/24/2021                      Value:  (COLON/RECTUM POLYP FORM - GI - All Specimens)                                                                                                                 :    Adenoma(s)      Gross  Description 06/24/2021                      Value:This result contains rich text formatting which cannot be displayed here.       Radiology Results  No results found.    Orders  No orders of the defined types were placed in this encounter.        Answers submitted by the patient for this visit:  Abdominal Pain Questionnaire (Submitted on 5/23/2024)  Chief Complaint: Abdominal pain  Chronicity: recurrent  Onset: more than 1 year ago  Onset quality: undetermined  Frequency: rarely  Progression since onset: unchanged  Pain location: LUQ, RUQ  Pain - numeric: 2/10  Pain quality: burning  Radiates to: does not radiate  anorexia: No  belching: Yes  flatus: Yes  hematochezia: No  melena: No  weight loss: No  Relieved by: bowel movements  Diagnostic workup: GI consult, lower endoscopy, surgery

## 2024-05-29 NOTE — TELEPHONE ENCOUNTER
Procedure: EGD/Colonoscopy  Date: 06/06/2024  Physician performing: Dr. Matthews  Location of procedure:  [facility]  Instructions given to patient: Yes  Diabetic: No  Clearances: N/A

## 2024-05-30 ENCOUNTER — ANESTHESIA (OUTPATIENT)
Dept: ANESTHESIOLOGY | Facility: HOSPITAL | Age: 64
End: 2024-05-30

## 2024-05-30 ENCOUNTER — ANESTHESIA EVENT (OUTPATIENT)
Dept: ANESTHESIOLOGY | Facility: HOSPITAL | Age: 64
End: 2024-05-30

## 2024-06-06 ENCOUNTER — HOSPITAL ENCOUNTER (OUTPATIENT)
Dept: GASTROENTEROLOGY | Facility: AMBULARY SURGERY CENTER | Age: 64
Setting detail: OUTPATIENT SURGERY
End: 2024-06-06
Payer: COMMERCIAL

## 2024-06-06 ENCOUNTER — ANESTHESIA (OUTPATIENT)
Dept: GASTROENTEROLOGY | Facility: AMBULARY SURGERY CENTER | Age: 64
End: 2024-06-06

## 2024-06-06 ENCOUNTER — ANESTHESIA EVENT (OUTPATIENT)
Dept: GASTROENTEROLOGY | Facility: AMBULARY SURGERY CENTER | Age: 64
End: 2024-06-06

## 2024-06-06 VITALS
DIASTOLIC BLOOD PRESSURE: 76 MMHG | SYSTOLIC BLOOD PRESSURE: 119 MMHG | BODY MASS INDEX: 32.66 KG/M2 | TEMPERATURE: 96 F | HEART RATE: 96 BPM | OXYGEN SATURATION: 98 % | HEIGHT: 61 IN | RESPIRATION RATE: 16 BRPM | WEIGHT: 173 LBS

## 2024-06-06 DIAGNOSIS — K21.9 GASTROESOPHAGEAL REFLUX DISEASE, UNSPECIFIED WHETHER ESOPHAGITIS PRESENT: ICD-10-CM

## 2024-06-06 DIAGNOSIS — Z80.0 FAMILY HISTORY OF COLON CANCER: ICD-10-CM

## 2024-06-06 DIAGNOSIS — K22.70 BARRETT'S ESOPHAGUS WITHOUT DYSPLASIA: ICD-10-CM

## 2024-06-06 DIAGNOSIS — Z86.010 PERSONAL HISTORY OF COLONIC POLYPS: ICD-10-CM

## 2024-06-06 PROCEDURE — 45385 COLONOSCOPY W/LESION REMOVAL: CPT | Performed by: INTERNAL MEDICINE

## 2024-06-06 PROCEDURE — 43239 EGD BIOPSY SINGLE/MULTIPLE: CPT | Performed by: INTERNAL MEDICINE

## 2024-06-06 PROCEDURE — 88305 TISSUE EXAM BY PATHOLOGIST: CPT | Performed by: PATHOLOGY

## 2024-06-06 RX ORDER — SODIUM CHLORIDE, SODIUM LACTATE, POTASSIUM CHLORIDE, CALCIUM CHLORIDE 600; 310; 30; 20 MG/100ML; MG/100ML; MG/100ML; MG/100ML
INJECTION, SOLUTION INTRAVENOUS CONTINUOUS PRN
Status: DISCONTINUED | OUTPATIENT
Start: 2024-06-06 | End: 2024-06-06

## 2024-06-06 RX ORDER — GLYCOPYRROLATE 0.2 MG/ML
INJECTION INTRAMUSCULAR; INTRAVENOUS AS NEEDED
Status: DISCONTINUED | OUTPATIENT
Start: 2024-06-06 | End: 2024-06-06

## 2024-06-06 RX ORDER — LIDOCAINE HYDROCHLORIDE 10 MG/ML
INJECTION, SOLUTION EPIDURAL; INFILTRATION; INTRACAUDAL; PERINEURAL AS NEEDED
Status: DISCONTINUED | OUTPATIENT
Start: 2024-06-06 | End: 2024-06-06

## 2024-06-06 RX ORDER — PROPOFOL 10 MG/ML
INJECTION, EMULSION INTRAVENOUS AS NEEDED
Status: DISCONTINUED | OUTPATIENT
Start: 2024-06-06 | End: 2024-06-06

## 2024-06-06 RX ADMIN — SODIUM CHLORIDE, SODIUM LACTATE, POTASSIUM CHLORIDE, AND CALCIUM CHLORIDE: .6; .31; .03; .02 INJECTION, SOLUTION INTRAVENOUS at 13:14

## 2024-06-06 RX ADMIN — PROPOFOL 150 MG: 10 INJECTION, EMULSION INTRAVENOUS at 13:32

## 2024-06-06 RX ADMIN — PROPOFOL 50 MG: 10 INJECTION, EMULSION INTRAVENOUS at 13:34

## 2024-06-06 RX ADMIN — GLYCOPYRROLATE 0.2 MCG: 0.2 INJECTION INTRAMUSCULAR; INTRAVENOUS at 13:29

## 2024-06-06 RX ADMIN — PROPOFOL 50 MG: 10 INJECTION, EMULSION INTRAVENOUS at 13:40

## 2024-06-06 RX ADMIN — PROPOFOL 50 MG: 10 INJECTION, EMULSION INTRAVENOUS at 13:38

## 2024-06-06 RX ADMIN — LIDOCAINE HYDROCHLORIDE 100 MG: 10 INJECTION, SOLUTION EPIDURAL; INFILTRATION; INTRACAUDAL; PERINEURAL at 13:32

## 2024-06-06 RX ADMIN — PROPOFOL 50 MG: 10 INJECTION, EMULSION INTRAVENOUS at 13:36

## 2024-06-06 NOTE — ANESTHESIA PREPROCEDURE EVALUATION
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      Anesthesia Plan  ASA Score- 2     Anesthesia Type- IV sedation with anesthesia with ASA Monitors.         Additional Monitors:     Airway Plan:            Plan Factors-Exercise tolerance (METS): >4 METS.    Chart reviewed.   Existing labs reviewed. Patient summary reviewed.    Patient is not a current smoker.              Induction- intravenous.    Postoperative Plan-         Informed Consent- Anesthetic plan and risks discussed with patient.  I personally reviewed this patient with the CRNA. Discussed and agreed on the Anesthesia Plan with the CRNA..         I have personally seen and examined this patient.  I have fully participated in the care of this patient. I have reviewed all pertinent clinical information, including history, physical exam, plan and the Resident’s note and agree except as noted.

## 2024-06-06 NOTE — ANESTHESIA POSTPROCEDURE EVALUATION
Post-Op Assessment Note    CV Status:  Stable  Pain Score: 0    Pain management: adequate       Mental Status:  Alert   Hydration Status:  Stable   PONV Controlled:  None   Airway Patency:  Patent     Post Op Vitals Reviewed: Yes    No anethesia notable event occurred.    Staff: CRNA               /70 (06/06/24 1355)    Temp (!) 96 °F (35.6 °C) (06/06/24 1355)    Pulse 99 (06/06/24 1355)   Resp 16 (06/06/24 1355)    SpO2 94 % (06/06/24 1355)

## 2024-06-06 NOTE — INTERVAL H&P NOTE
H&P reviewed. After examining the patient I find no changes in the patients condition since the H&P had been written.    Vitals:    06/06/24 1251   BP: 132/83   Pulse: 88   Resp: 18   Temp: (!) 96.8 °F (36 °C)   SpO2: 97%

## 2024-06-07 ENCOUNTER — NURSE TRIAGE (OUTPATIENT)
Age: 64
End: 2024-06-07

## 2024-06-07 NOTE — TELEPHONE ENCOUNTER
Regarding: results  ----- Message from Cyndie SMITH sent at 6/7/2024  8:52 AM EDT -----  Patients GI provider:  Dr. Matthews     Number to return call: [phone]    Reason for call: Pt calling stating she is returning a call from someone for results, patient is requesting a call back after 12:30    Scheduled procedure/appointment date if applicable: Apt/procedure

## 2024-06-07 NOTE — TELEPHONE ENCOUNTER
"DARLENE Quach (GERD, Harris's, hx colon polyps). Procedure yesterday combo with Dr. Matthews    I spoke with patient and she stated she reviewed her MyChart and is aware no results. I informed her call was most likely from GI lab as follow up to yesterday's procedure. Patient states she is feeling fine.          Reason for Disposition   Information only question and nurse able to answer    Answer Assessment - Initial Assessment Questions  1. REASON FOR CALL or QUESTION: \"What is your reason for calling today?\" or \"How can I best help you?\" or \"What question do you have that I can help answer?\"      Patient returned call from earlier message.    Protocols used: Information Only Call - No Triage-ADULT-[place]    "

## 2024-06-12 PROCEDURE — 88305 TISSUE EXAM BY PATHOLOGIST: CPT | Performed by: PATHOLOGY

## 2024-11-25 DIAGNOSIS — K22.70 BARRETT'S ESOPHAGUS WITHOUT DYSPLASIA: ICD-10-CM

## 2024-11-25 DIAGNOSIS — K44.9 HIATAL HERNIA: ICD-10-CM

## 2025-06-23 NOTE — TELEPHONE ENCOUNTER
I have never heard of Becky Shipman causing a panic attack  If she wishes, she could retry it - if it happens again then stop - but I doubt it will happen again  Or if she wishes to stay off of it, that's okay too   Could try Estrace cream Patient is in the supine position.   The patient was positioned by Nia Patrick RN